# Patient Record
Sex: MALE | Race: WHITE | URBAN - METROPOLITAN AREA
[De-identification: names, ages, dates, MRNs, and addresses within clinical notes are randomized per-mention and may not be internally consistent; named-entity substitution may affect disease eponyms.]

---

## 2023-04-13 ENCOUNTER — OFFICE (OUTPATIENT)
Dept: URBAN - METROPOLITAN AREA CLINIC 104 | Facility: CLINIC | Age: 48
Setting detail: OPHTHALMOLOGY
End: 2023-04-13

## 2023-04-13 DIAGNOSIS — Z01.11: ICD-10-CM

## 2023-04-13 PROCEDURE — 90002 FAA EXAMINATION: CPT | Performed by: SPECIALIST

## 2023-04-13 ASSESSMENT — VISUAL ACUITY
OD_BCVA: 20/20
OS_BCVA: 20/20

## 2023-04-13 ASSESSMENT — CONFRONTATIONAL VISUAL FIELD TEST (CVF)
OD_FINDINGS: FULL
OS_FINDINGS: FULL

## 2023-12-26 PROBLEM — Z00.00 ENCOUNTER FOR PREVENTIVE HEALTH EXAMINATION: Status: ACTIVE | Noted: 2023-12-26

## 2024-01-28 ENCOUNTER — NON-APPOINTMENT (OUTPATIENT)
Age: 49
End: 2024-01-28

## 2024-01-29 ENCOUNTER — APPOINTMENT (OUTPATIENT)
Dept: PULMONOLOGY | Facility: CLINIC | Age: 49
End: 2024-01-29
Payer: COMMERCIAL

## 2024-01-29 ENCOUNTER — TRANSCRIPTION ENCOUNTER (OUTPATIENT)
Age: 49
End: 2024-01-29

## 2024-01-29 ENCOUNTER — NON-APPOINTMENT (OUTPATIENT)
Age: 49
End: 2024-01-29

## 2024-01-29 ENCOUNTER — LABORATORY RESULT (OUTPATIENT)
Age: 49
End: 2024-01-29

## 2024-01-29 VITALS
DIASTOLIC BLOOD PRESSURE: 89 MMHG | SYSTOLIC BLOOD PRESSURE: 129 MMHG | HEIGHT: 71 IN | HEART RATE: 83 BPM | OXYGEN SATURATION: 98 % | WEIGHT: 190 LBS | BODY MASS INDEX: 26.6 KG/M2

## 2024-01-29 VITALS
BODY MASS INDEX: 26.88 KG/M2 | DIASTOLIC BLOOD PRESSURE: 94 MMHG | HEIGHT: 71 IN | WEIGHT: 192 LBS | RESPIRATION RATE: 15 BRPM | SYSTOLIC BLOOD PRESSURE: 132 MMHG | TEMPERATURE: 97.3 F | OXYGEN SATURATION: 96 % | HEART RATE: 82 BPM

## 2024-01-29 DIAGNOSIS — R53.83 OTHER FATIGUE: ICD-10-CM

## 2024-01-29 DIAGNOSIS — Z87.442 PERSONAL HISTORY OF URINARY CALCULI: ICD-10-CM

## 2024-01-29 DIAGNOSIS — Z87.09 PERSONAL HISTORY OF OTHER DISEASES OF THE RESPIRATORY SYSTEM: ICD-10-CM

## 2024-01-29 DIAGNOSIS — D69.3 IMMUNE THROMBOCYTOPENIC PURPURA: ICD-10-CM

## 2024-01-29 DIAGNOSIS — N20.0 CALCULUS OF KIDNEY: ICD-10-CM

## 2024-01-29 DIAGNOSIS — U07.1 COVID-19: ICD-10-CM

## 2024-01-29 DIAGNOSIS — Z86.69 PERSONAL HISTORY OF OTHER DISEASES OF THE NERVOUS SYSTEM AND SENSE ORGANS: ICD-10-CM

## 2024-01-29 DIAGNOSIS — Z86.39 PERSONAL HISTORY OF OTHER ENDOCRINE, NUTRITIONAL AND METABOLIC DISEASE: ICD-10-CM

## 2024-01-29 PROCEDURE — ZZZZZ: CPT

## 2024-01-29 PROCEDURE — 94060 EVALUATION OF WHEEZING: CPT

## 2024-01-29 PROCEDURE — 94729 DIFFUSING CAPACITY: CPT

## 2024-01-29 PROCEDURE — 36415 COLL VENOUS BLD VENIPUNCTURE: CPT

## 2024-01-29 PROCEDURE — 94726 PLETHYSMOGRAPHY LUNG VOLUMES: CPT

## 2024-01-29 PROCEDURE — 99205 OFFICE O/P NEW HI 60 MIN: CPT | Mod: 25

## 2024-01-29 RX ORDER — ASPIRIN 81 MG
81 TABLET, DELAYED RELEASE (ENTERIC COATED) ORAL
Refills: 0 | Status: ACTIVE | COMMUNITY

## 2024-01-29 NOTE — PHYSICAL EXAM
[No Acute Distress] : no acute distress [Normal Oropharynx] : normal oropharynx [Normal Appearance] : normal appearance [No Neck Mass] : no neck mass [Normal Rate/Rhythm] : normal rate/rhythm [Normal S1, S2] : normal s1, s2 [No Murmurs] : no murmurs [No Resp Distress] : no resp distress [Clear to Auscultation Bilaterally] : clear to auscultation bilaterally [No Abnormalities] : no abnormalities [Benign] : benign [Normal Gait] : normal gait [No Clubbing] : no clubbing [No Cyanosis] : no cyanosis [No Edema] : no edema [FROM] : FROM [Normal Color/ Pigmentation] : normal color/ pigmentation [No Focal Deficits] : no focal deficits [Oriented x3] : oriented x3 [Normal Affect] : normal affect [TextBox_99] : hand tremors

## 2024-01-29 NOTE — DISCUSSION/SUMMARY
[FreeTextEntry1] : ---Assessment plan----------The patient has been referred here for further opinion regarding pulmonary problem, 49 yo referred for eval of PULMONARY HYPERTENSION,  H/O SPLENECTOMY FOR ITP-- PMH ITP, renal calculi, essential tremors 1) PULM HTN-----START SILDENAFIL--10 MG PO TID  --- with escalated to 20 mg 3 times daily------- patient's right heart cath does show evidence of precapillary pulmonary hypertension but I am increasing increase intrigued by the decreased LV ejection fraction as well I have advised that he seek another opinion from cardiology and advanced heart failure center at [facility] from Dr. ELIZABETH MYERS------ 2) labs drawn in our office today 3) advised no work until eval completed 4) V/Q SCAN  5 PSG 6---DR ELIZABETH MYERS HEART FAILURE patient's cardiologist has also started patient on metoprolol and Entresto  -- f/u in 2weeks  Thanks for allowing  me to participate  in the care of this patient.  Patient at this time  will follow  the above mentioned recommendations and return back for follow up visit. If you have any questions  I can be reached  at # [phone] (office).  Waldemar Mcconnell MD, [facility]  Pulmonary, Critical Care and Sleep Medicine

## 2024-01-29 NOTE — END OF VISIT
[FreeTextEntry3] :   I, Dr. Waldemar Mcconnell  personally performed the evaluation and management (E/M) services for this established patient who presents today with (a) new problem(s)/exacerbation of (an) existing condition(s). That E/M includes conducting the clinically appropriate interval history &/or exam, assessing all new/exacerbated conditions, and establishing a new plan of care. Today, my АЛЕКСАНДР, Ligia Lawrence NP, , was here to observe my evaluation and management service for this new problem/exacerbated condition and follow the plan of care established by me going forward. [Time Spent: ___ minutes] : I have spent [unfilled] minutes of time on the encounter. [>50% of the face to face encounter time was spent on counseling and/or coordination of care for ___] : Greater than 50% of the face to face encounter time was spent on counseling and/or coordination of care for [unfilled]

## 2024-01-29 NOTE — HISTORY OF PRESENT ILLNESS
[Never] : never [TextBox_4] : This letter  is regarding your patient  who  attended pulmonary out patient office today.  I have reviewed  patient's  past history, social history, family history and medication list. I also  reviewed nurse practitioners/ and fellows  notes and assessment and agree with it.   The patient was referred by /[facility]  49yo referred for newly diagnosed pulm htn PMH ITP in 1990's w/splenectomy, Essential tremors, renal stones w/lithotripsy Works as a  (helicopter) Lifelong nonsmoker  Post covid (12/2022) developed fatigue, ATKINSON. Eval by PMD showed tachycardia which led to cardiology consult- echo showed elevated estimated pa pressures- Admitted to [facility] last month and underwent cardiac cath    ------No history of , fever, chills , rigors, chest pain, or hemoptysis. Questionable history of Raynaud's phenomenon. No h/o significant weight loss in last few months. No history of liver dysfunction , collagen vascular disorder or chronic thromboembolic disease. I would classify the patient's dyspnea as WHO  FUNCTIONAL CLASS II--------  ----Echo  date--2023----LV EF 40-45%, RA and RV dilatation moderate tricuspid regurgitation PASP 73 [facility] --CARDIAC MRI  [facility]-2023---moderately reduced LV systolic function with evidence of RV pressure volume overload moderately dilated right ventricle with reduced systolic function no evidence of intracardiac shunt ----Pft date---1/2024 normal study------ ----Ct scan date-[facility] 2023 -NO PE----- ----Cath date--[facility] 2023--RA 16, PCWP 12 - PA 79/42  RV 79/23 , CO 3.01L/MIN, CI 1.49L/MIN/M2   1/2024 accompanied by wife c/o fatigue, ATKINSON, one recent episode of near syncope (but occured when he was recovering from flu)

## 2024-01-30 ENCOUNTER — NON-APPOINTMENT (OUTPATIENT)
Age: 49
End: 2024-01-30

## 2024-01-30 ENCOUNTER — LABORATORY RESULT (OUTPATIENT)
Age: 49
End: 2024-01-30

## 2024-01-30 ENCOUNTER — RX RENEWAL (OUTPATIENT)
Age: 49
End: 2024-01-30

## 2024-01-30 LAB
25(OH)D3 SERPL-MCNC: 25.4 NG/ML
A1AT SERPL-MCNC: 205 MG/DL
ALBUMIN SERPL ELPH-MCNC: 4.6 G/DL
ALP BLD-CCNC: 66 U/L
ALT SERPL-CCNC: 28 U/L
ANION GAP SERPL CALC-SCNC: 18 MMOL/L
AST SERPL-CCNC: 22 U/L
BILIRUB SERPL-MCNC: 0.9 MG/DL
BUN SERPL-MCNC: 14 MG/DL
CALCIUM SERPL-MCNC: 10.1 MG/DL
CALCIUM SERPL-MCNC: 10.1 MG/DL
CARDIOLIPIN AB SER IA-ACNC: NEGATIVE
CCP AB SER IA-ACNC: <8 UNITS
CHLORIDE SERPL-SCNC: 101 MMOL/L
CO2 SERPL-SCNC: 19 MMOL/L
CREAT SERPL-MCNC: 1.1 MG/DL
CRP SERPL-MCNC: 4 MG/L
DEPRECATED KAPPA LC FREE/LAMBDA SER: 1.29 RATIO
EGFR: 82 ML/MIN/1.73M2
ERYTHROCYTE [SEDIMENTATION RATE] IN BLOOD BY WESTERGREN METHOD: 26 MM/HR
FOLATE RBC-MCNC: 605 NG/ML
GLUCOSE SERPL-MCNC: 102 MG/DL
HCT VFR BLD CALC: 55.6 %
HCT VFR BLD CALC: 55.7 %
HGB BLD-MCNC: 18.8 G/DL
IGA SER QL IEP: 219 MG/DL
IGG SER QL IEP: 1108 MG/DL
IGM SER QL IEP: 22 MG/DL
INR PPP: 0.99 RATIO
KAPPA LC CSF-MCNC: 1.63 MG/DL
KAPPA LC SERPL-MCNC: 2.11 MG/DL
MCHC RBC-ENTMCNC: 30 PG
MCHC RBC-ENTMCNC: 33.8 GM/DL
MCV RBC AUTO: 88.7 FL
NT-PROBNP SERPL-MCNC: 1873 PG/ML
PARATHYROID HORMONE INTACT: 48 PG/ML
PHOSPHATE SERPL-MCNC: 3.6 MG/DL
PLATELET # BLD AUTO: 461 K/UL
POTASSIUM SERPL-SCNC: 5.1 MMOL/L
PROT SERPL-MCNC: 7.4 G/DL
PT BLD: 11.2 SEC
RBC # BLD: 6.27 M/UL
RBC # FLD: 14.4 %
RF+CCP IGG SER-IMP: NEGATIVE
RHEUMATOID FACT SER QL: <10 IU/ML
SODIUM SERPL-SCNC: 138 MMOL/L
TOTAL IGE SMQN RAST: 6 KU/L
TSH SERPL-ACNC: 6.06 UIU/ML
URATE SERPL-MCNC: 3.5 MG/DL
VIT B12 SERPL-MCNC: 435 PG/ML
WBC # FLD AUTO: 9.9 K/UL

## 2024-01-31 LAB
ANACR T: NEGATIVE
ENA SCL70 IGG SER IA-ACNC: <0.2 AL
ENA SS-A AB SER IA-ACNC: <0.2 AL
ENA SS-B AB SER IA-ACNC: <0.2 AL
M TB IFN-G BLD-IMP: NEGATIVE
QUANTIFERON TB PLUS MITOGEN MINUS NIL: 6.18 IU/ML
QUANTIFERON TB PLUS NIL: 0.02 IU/ML
QUANTIFERON TB PLUS TB1 MINUS NIL: 0 IU/ML
QUANTIFERON TB PLUS TB2 MINUS NIL: -0.01 IU/ML
T3 SERPL-MCNC: 124 NG/DL
T3RU NFR SERPL: 1 TBI
T4 FREE SERPL-MCNC: 1 NG/DL
T4 SERPL-MCNC: 6.7 UG/DL

## 2024-02-02 LAB
ALBUMIN MFR SERPL ELPH: 57.7 %
ALBUMIN SERPL-MCNC: 4.4 G/DL
ALBUMIN/GLOB SERPL: 1.3 RATIO
ALPHA1 GLOB MFR SERPL ELPH: 4.9 %
ALPHA1 GLOB SERPL ELPH-MCNC: 0.4 G/DL
ALPHA2 GLOB MFR SERPL ELPH: 12 %
ALPHA2 GLOB SERPL ELPH-MCNC: 0.9 G/DL
B-GLOBULIN MFR SERPL ELPH: 11.9 %
B-GLOBULIN SERPL ELPH-MCNC: 0.9 G/DL
GAMMA GLOB FLD ELPH-MCNC: 1 G/DL
GAMMA GLOB MFR SERPL ELPH: 13.5 %
INTERPRETATION SERPL IEP-IMP: NORMAL
PROT SERPL-MCNC: 7.7 G/DL
PROT SERPL-MCNC: 7.7 G/DL

## 2024-02-05 ENCOUNTER — APPOINTMENT (OUTPATIENT)
Dept: NUCLEAR MEDICINE | Facility: HOSPITAL | Age: 49
End: 2024-02-05

## 2024-02-05 ENCOUNTER — APPOINTMENT (OUTPATIENT)
Dept: RADIOLOGY | Facility: HOSPITAL | Age: 49
End: 2024-02-05

## 2024-02-05 ENCOUNTER — OUTPATIENT (OUTPATIENT)
Dept: OUTPATIENT SERVICES | Facility: HOSPITAL | Age: 49
LOS: 1 days | End: 2024-02-05
Payer: COMMERCIAL

## 2024-02-05 DIAGNOSIS — I27.20 PULMONARY HYPERTENSION, UNSPECIFIED: ICD-10-CM

## 2024-02-05 PROCEDURE — 71046 X-RAY EXAM CHEST 2 VIEWS: CPT | Mod: 26

## 2024-02-05 PROCEDURE — 78582 LUNG VENTILAT&PERFUS IMAGING: CPT | Mod: 26,GC

## 2024-02-08 ENCOUNTER — NON-APPOINTMENT (OUTPATIENT)
Age: 49
End: 2024-02-08

## 2024-02-08 ENCOUNTER — APPOINTMENT (OUTPATIENT)
Dept: HEART FAILURE | Facility: CLINIC | Age: 49
End: 2024-02-08
Payer: COMMERCIAL

## 2024-02-08 VITALS
SYSTOLIC BLOOD PRESSURE: 120 MMHG | WEIGHT: 192 LBS | HEIGHT: 71 IN | HEART RATE: 82 BPM | DIASTOLIC BLOOD PRESSURE: 84 MMHG | BODY MASS INDEX: 26.88 KG/M2 | OXYGEN SATURATION: 97 %

## 2024-02-08 PROCEDURE — 99205 OFFICE O/P NEW HI 60 MIN: CPT | Mod: 25

## 2024-02-08 PROCEDURE — 93000 ELECTROCARDIOGRAM COMPLETE: CPT

## 2024-02-08 RX ORDER — DAPAGLIFLOZIN 10 MG/1
10 TABLET, FILM COATED ORAL
Qty: 30 | Refills: 2 | Status: ACTIVE | COMMUNITY

## 2024-02-08 RX ORDER — METOPROLOL SUCCINATE 50 MG/1
50 TABLET, EXTENDED RELEASE ORAL
Refills: 2 | Status: ACTIVE | COMMUNITY

## 2024-02-08 RX ORDER — ROSUVASTATIN CALCIUM 40 MG/1
40 TABLET, FILM COATED ORAL
Qty: 90 | Refills: 1 | Status: ACTIVE | COMMUNITY

## 2024-02-08 RX ORDER — SACUBITRIL AND VALSARTAN 49; 51 MG/1; MG/1
49-51 TABLET, FILM COATED ORAL TWICE DAILY
Qty: 60 | Refills: 2 | Status: ACTIVE | COMMUNITY

## 2024-02-11 NOTE — DISCUSSION/SUMMARY
[EKG obtained to assist in diagnosis and management of assessed problem(s)] : EKG obtained to assist in diagnosis and management of assessed problem(s)
[EKG obtained to assist in diagnosis and management of assessed problem(s)] : EKG obtained to assist in diagnosis and management of assessed problem(s)
room air

## 2024-02-11 NOTE — PHYSICAL EXAM
[Well Developed] : well developed [Well Nourished] : well nourished [No Acute Distress] : no acute distress [Normal Conjunctiva] : normal conjunctiva [No Carotid Bruit] : no carotid bruit [Normal S1, S2] : normal S1, S2 [No Murmur] : no murmur [No Rub] : no rub [No Gallop] : no gallop [Clear Lung Fields] : clear lung fields [Good Air Entry] : good air entry [No Respiratory Distress] : no respiratory distress  [Soft] : abdomen soft [Non Tender] : non-tender [No Masses/organomegaly] : no masses/organomegaly [Normal Bowel Sounds] : normal bowel sounds [Normal Gait] : normal gait [No Edema] : no edema [No Cyanosis] : no cyanosis [No Varicosities] : no varicosities [No Rash] : no rash [No Skin Lesions] : no skin lesions [Moves all extremities] : moves all extremities [No Focal Deficits] : no focal deficits [Normal Speech] : normal speech [Alert and Oriented] : alert and oriented [Normal memory] : normal memory [de-identified] : mild tremulousness [de-identified] : JVP approx 6-8 with mild HJR [de-identified] : RRR; no prominent P2; no m/r/g; no RV heave [de-identified] : no clubbing

## 2024-02-11 NOTE — PHYSICAL EXAM
[Well Developed] : well developed [Well Nourished] : well nourished [No Acute Distress] : no acute distress [Normal Conjunctiva] : normal conjunctiva [No Carotid Bruit] : no carotid bruit [Normal S1, S2] : normal S1, S2 [No Murmur] : no murmur [No Rub] : no rub [No Gallop] : no gallop [Clear Lung Fields] : clear lung fields [Good Air Entry] : good air entry [No Respiratory Distress] : no respiratory distress  [Soft] : abdomen soft [Non Tender] : non-tender [No Masses/organomegaly] : no masses/organomegaly [Normal Bowel Sounds] : normal bowel sounds [Normal Gait] : normal gait [No Edema] : no edema [No Cyanosis] : no cyanosis [No Varicosities] : no varicosities [No Rash] : no rash [No Skin Lesions] : no skin lesions [Moves all extremities] : moves all extremities [No Focal Deficits] : no focal deficits [Normal Speech] : normal speech [Alert and Oriented] : alert and oriented [Normal memory] : normal memory [de-identified] : RRR; no prominent P2; no m/r/g; no RV heave [de-identified] : mild tremulousness [de-identified] : JVP approx 6-8 with mild HJR [de-identified] : no clubbing

## 2024-02-11 NOTE — ASSESSMENT
[FreeTextEntry1] : Briefly, 47 y/o M w/ h/o ITP s/p splenectomy (1990s), pulmonary HTN (followed by Dr. 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 secondary; Hb 19) who presents for establishment of care. Currently appears euvolemic and well compensated with NYHA class II symptoms. Unclear etiology of pulm HTN but possibility raised for group I. Lower likelihood raised for possible group II as PCWP was wnl. Given polycythemia, may have a component of KAREN given normal sats on RA currently and was ruled out for VARGAS-2 mutation. Overall, will need repeat evaluation.   1. HFmrEF/NICM  - c/w Entresto 49/51 twice/day - c/w Farxiga 10 mg daily - c/w toprol 50 mg daily - no current diuretic need but prescribed lasix prn  - repeat TTE - counseled on disease process  - maintain log of weight/BP  - labs reviewed   2. Pulm HTN - unclear etiology - workup as above - will likely need repeat R/LHC but will obtain TTE first - pending sleep study  RTC 2 months with me

## 2024-02-12 ENCOUNTER — NON-APPOINTMENT (OUTPATIENT)
Age: 49
End: 2024-02-12

## 2024-02-12 ENCOUNTER — APPOINTMENT (OUTPATIENT)
Dept: PULMONOLOGY | Facility: CLINIC | Age: 49
End: 2024-02-12
Payer: COMMERCIAL

## 2024-02-12 VITALS
RESPIRATION RATE: 15 BRPM | BODY MASS INDEX: 26.46 KG/M2 | OXYGEN SATURATION: 98 % | SYSTOLIC BLOOD PRESSURE: 139 MMHG | DIASTOLIC BLOOD PRESSURE: 95 MMHG | HEIGHT: 71 IN | WEIGHT: 189 LBS | TEMPERATURE: 97.8 F | HEART RATE: 81 BPM

## 2024-02-12 PROCEDURE — 99214 OFFICE O/P EST MOD 30 MIN: CPT

## 2024-02-12 NOTE — END OF VISIT
[Time Spent: ___ minutes] : I have spent [unfilled] minutes of time on the encounter. [>50% of the face to face encounter time was spent on counseling and/or coordination of care for ___] : Greater than 50% of the face to face encounter time was spent on counseling and/or coordination of care for [unfilled] [FreeTextEntry3] :   I, Dr. Waldemar Mcconnell  personally performed the evaluation and management (E/M) services for this established patient who presents today with (a) new problem(s)/exacerbation of (an) existing condition(s). That E/M includes conducting the clinically appropriate interval history &/or exam, assessing all new/exacerbated conditions, and establishing a new plan of care. Today, my АЛЕКСАНДР, Ligia Lawrence NP, , was here to observe my evaluation and management service for this new problem/exacerbated condition and follow the plan of care established by me going forward.

## 2024-02-12 NOTE — CARDIOLOGY SUMMARY
[de-identified] : 2/8/24 - NSR, nl axis, TWI V1-V4, II/III/aVF [de-identified] : 2023 - EF 40-45%, RA and RV dilatation, mod TR, PASP 73 [de-identified] : 2023 - MRI ([place]) - mod LV dysfunction, RV pressure/volume overload; no intracardiac shunt   2023 - CT scan ([facility]) - no PE [de-identified] : [facility] 2023 - RA 16, RV 79/23, PA 79/42/54, PCWP 12, CO/CI 3/1.49, TPG 42, DPG 30, PVR 14

## 2024-02-12 NOTE — DISCUSSION/SUMMARY
[FreeTextEntry1] : ---Assessment plan----------The patient has been referred here for further opinion regarding pulmonary problem, 49 yo referred for eval of PULMONARY HYPERTENSION,  H/O SPLENECTOMY FOR ITP-- PMH ITP, renal calculi, essential tremors 1) PULM HTN---ON  SILDENAFIL--10 MG PO TID  --- with escalated to 20 mg 3 times daily------- patient's right heart cath does show evidence of precapillary pulmonary hypertension but I am increasing increase intrigued by the decreased LV ejection fraction as well I have advised that he seek another opinion from cardiology and advanced heart failure center at [facility] from Dr. ELIZABETH MYERS---  --- awaiting repeat echo and possible right heart cath 2) HST  TO R/O KAREN--- 3) advised no work until eval completed 4) V/Q SCAN  5 PSG 6---DR ELIZABETH MYERS HEART FAILURE patient's cardiologist has also started patient on metoprolol and Entresto  -- f/u in 2weeks  Thanks for allowing  me to participate  in the care of this patient.  Patient at this time  will follow  the above mentioned recommendations and return back for follow up visit. If you have any questions  I can be reached  at # [phone] (office).  Waldemar Mcconnell MD, [facility]  Pulmonary, Critical Care and Sleep Medicine

## 2024-02-12 NOTE — CARDIOLOGY SUMMARY
[de-identified] : 2/8/24 - NSR, nl axis, TWI V1-V4, II/III/aVF [de-identified] : 2023 - EF 40-45%, RA and RV dilatation, mod TR, PASP 73 [de-identified] : 2023 - MRI ([facility]) - mod LV dysfunction, RV pressure/volume overload; no intracardiac shunt   2023 - CT scan ([facility]) - no PE [de-identified] : [facility] 2023 - RA 16, RV 79/23, PA 79/42/54, PCWP 12, CO/CI 3/1.49, TPG 42, DPG 30, PVR 14

## 2024-02-12 NOTE — HISTORY OF PRESENT ILLNESS
[FreeTextEntry1] : Briefly, 47 y/o M w/ h/o ITP s/p splenectomy (1990s), pulmonary HTN (followed by Dr. 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 secondary; Hb 19) who presents for establishment of care.  His HPI began in 12/2022 when developed Covid and had fatigue and dyspnea on exertion. Recovered at home. Started to get tired in February 2023. Was worsening over course of the year with some dyspnea with flight of stairs or exercise. Denied orthopnea/PND. Went to his PCP in December 2023. He was found to be tachycardic by his PMD and referred to Dr. Stockton (cardiologist). An echo showed elevated PA pressures so was admitted to [facility] (12/20-12/22). Had a CTA which was negative for PE. Had a cardiac MRI which showed RV pressure/overload with LVEF 37%. Underwent RHC which showed significantly elevated PA pressures (details below) with approximate PVR 14. Was started on toprol, Entresto, Farxiga with improvement. Was also initially on hydralazine but had syncope so was stopped. Did have a presyncopal episode with coughing spell.   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 Was seen by hematology and diagnosed with ITP. Was treated with steroids with transient improvement but had other medication which was ineffective. Underwent splenectomy 11/98 and no issues since.   Denies any snoring. Feels well rested in morning. Denies daytime somnolence. Has sleep study scheduled 2/16.   Reports quick bursts of exercise will cause some dyspnea. Previously was exercising on a treadmill and tolerating gradual increase in activity. Able to go up a flight of stairs without issues.   Recently denies orthopnea/PND. Did note some bendopnea.   Seen by Dr. Mcconnell 1/29/24 where pulmonary HTN workup was started: 2/5/24 - V/Q scan low probability 1/29/24 - CT - no LAD; no PE; "unremarkable" 1/30/24 - serologies negative 1/29/24 - FEV1 4, FVC 5.2, FEV1/FVC 78%, DLCO 104%

## 2024-02-12 NOTE — HISTORY OF PRESENT ILLNESS
[FreeTextEntry1] : Briefly, 49 y/o M w/ h/o ITP s/p splenectomy (1990s), pulmonary HTN (followed by Dr. 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 secondary; Hb 19) who presents for establishment of care.  His HPI began in 12/2022 when developed Covid and had fatigue and dyspnea on exertion. Recovered at home. Started to get tired in February 2023. Was worsening over course of the year with some dyspnea with flight of stairs or exercise. Denied orthopnea/PND. Went to his PCP in December 2023. He was found to be tachycardic by his PMD and referred to Dr. Stockton (cardiologist). An echo showed elevated PA pressures so was admitted to [facility] (12/20-12/22). Had a CTA which was negative for PE. Had a cardiac MRI which showed RV pressure/overload with LVEF 37%. Underwent RHC which showed significantly elevated PA pressures (details below) with approximate PVR 14. Was started on toprol, Entresto, Farxiga with improvement. Was also initially on hydralazine but had syncope so was stopped. Did have a presyncopal episode with coughing spell.   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 Was seen by hematology and diagnosed with ITP. Was treated with steroids with transient improvement but had other medication which was ineffective. Underwent splenectomy 11/98 and no issues since.   Denies any snoring. Feels well rested in morning. Denies daytime somnolence. Has sleep study scheduled 2/16.   Reports quick bursts of exercise will cause some dyspnea. Previously was exercising on a treadmill and tolerating gradual increase in activity. Able to go up a flight of stairs without issues.   Recently denies orthopnea/PND. Did note some bendopnea.   Seen by Dr. Mcconnell 1/29/24 where pulmonary HTN workup was started: 2/5/24 - V/Q scan low probability 1/29/24 - CT - no LAD; no PE; "unremarkable" 1/30/24 - serologies negative 1/29/24 - FEV1 4, FVC 5.2, FEV1/FVC 78%, DLCO 104%

## 2024-02-12 NOTE — SOCIAL HISTORY
[TextEntry] : Occasional EtOH (social). Denies tobacco use. Works as  (hasn't been flying since hospitalization). Lives with wife. Has 2 children (25 and 22).

## 2024-02-12 NOTE — HISTORY OF PRESENT ILLNESS
[Never] : never [TextBox_4] : This letter  is regarding your patient  who  attended pulmonary out patient office today.  I have reviewed  patient's  past history, social history, family history and medication list. I also  reviewed nurse practitioners/ and fellows  notes and assessment and agree with it.   The patient was referred by /[facility]  47yo referred for newly diagnosed pulm htn PMH ITP in 1990's w/splenectomy, Essential tremors, renal stones w/lithotripsy Works as a  (helicopter) Lifelong nonsmoker  Post covid (12/2022) developed fatigue, ATKINSON. Eval by PMD showed tachycardia which led to cardiology consult- echo showed elevated estimated pa pressures- Admitted to [facility] last month and underwent cardiac cath    ------No history of , fever, chills , rigors, chest pain, or hemoptysis. Questionable history of Raynaud's phenomenon. No h/o significant weight loss in last few months. No history of liver dysfunction , collagen vascular disorder or chronic thromboembolic disease. I would classify the patient's dyspnea as WHO  FUNCTIONAL CLASS II--------  ----Echo  date--2023----LV EF 40-45%, RA and RV dilatation moderate tricuspid regurgitation PASP 73 [facility] --CARDIAC MRI   [facility]-2023---moderately reduced LV systolic function with evidence of RV pressure volume overload moderately dilated right ventricle with reduced systolic function no evidence of intracardiac shunt ----Pft date---1/2024 normal study------ ----Ct scan date-[facility] 2023 -NO PE----- ----Cath date--[facility] 2023--RA 16, PCWP 12 - PA 79/42  RV 79/23 , CO 3.01L/MIN, CI 1.49L/MIN/M2   1/2024 accompanied by wife c/o fatigue, ATKINSON, one recent episode of near syncope (but occured when he was recovering from flu)

## 2024-02-26 ENCOUNTER — TRANSCRIPTION ENCOUNTER (OUTPATIENT)
Age: 49
End: 2024-02-26

## 2024-03-01 ENCOUNTER — TRANSCRIPTION ENCOUNTER (OUTPATIENT)
Age: 49
End: 2024-03-01

## 2024-03-04 ENCOUNTER — OUTPATIENT (OUTPATIENT)
Dept: OUTPATIENT SERVICES | Facility: HOSPITAL | Age: 49
LOS: 1 days | End: 2024-03-04
Payer: COMMERCIAL

## 2024-03-04 ENCOUNTER — APPOINTMENT (OUTPATIENT)
Dept: SLEEP CENTER | Facility: CLINIC | Age: 49
End: 2024-03-04
Payer: COMMERCIAL

## 2024-03-04 PROCEDURE — 95800 SLP STDY UNATTENDED: CPT | Mod: 26

## 2024-03-04 PROCEDURE — 95800 SLP STDY UNATTENDED: CPT

## 2024-03-05 ENCOUNTER — NON-APPOINTMENT (OUTPATIENT)
Age: 49
End: 2024-03-05

## 2024-03-10 ENCOUNTER — TRANSCRIPTION ENCOUNTER (OUTPATIENT)
Age: 49
End: 2024-03-10

## 2024-03-10 ENCOUNTER — RX RENEWAL (OUTPATIENT)
Age: 49
End: 2024-03-10

## 2024-03-10 RX ORDER — FUROSEMIDE 20 MG/1
20 TABLET ORAL
Qty: 90 | Refills: 1 | Status: ACTIVE | COMMUNITY
Start: 2024-02-08 | End: 1900-01-01

## 2024-03-11 DIAGNOSIS — G47.33 OBSTRUCTIVE SLEEP APNEA (ADULT) (PEDIATRIC): ICD-10-CM

## 2024-03-15 ENCOUNTER — TRANSCRIPTION ENCOUNTER (OUTPATIENT)
Age: 49
End: 2024-03-15

## 2024-03-19 ENCOUNTER — RESULT REVIEW (OUTPATIENT)
Age: 49
End: 2024-03-19

## 2024-03-19 ENCOUNTER — APPOINTMENT (OUTPATIENT)
Dept: CV DIAGNOSITCS | Facility: HOSPITAL | Age: 49
End: 2024-03-19

## 2024-03-19 ENCOUNTER — OUTPATIENT (OUTPATIENT)
Dept: OUTPATIENT SERVICES | Facility: HOSPITAL | Age: 49
LOS: 1 days | End: 2024-03-19
Payer: COMMERCIAL

## 2024-03-19 DIAGNOSIS — I27.20 PULMONARY HYPERTENSION, UNSPECIFIED: ICD-10-CM

## 2024-03-19 PROCEDURE — 93306 TTE W/DOPPLER COMPLETE: CPT | Mod: 26

## 2024-04-01 ENCOUNTER — RX RENEWAL (OUTPATIENT)
Age: 49
End: 2024-04-01

## 2024-04-01 ENCOUNTER — NON-APPOINTMENT (OUTPATIENT)
Age: 49
End: 2024-04-01

## 2024-04-01 ENCOUNTER — APPOINTMENT (OUTPATIENT)
Dept: PULMONOLOGY | Facility: CLINIC | Age: 49
End: 2024-04-01
Payer: COMMERCIAL

## 2024-04-01 VITALS
HEART RATE: 72 BPM | HEIGHT: 71 IN | DIASTOLIC BLOOD PRESSURE: 78 MMHG | BODY MASS INDEX: 26.6 KG/M2 | SYSTOLIC BLOOD PRESSURE: 119 MMHG | WEIGHT: 190 LBS | TEMPERATURE: 97.9 F | OXYGEN SATURATION: 98 % | RESPIRATION RATE: 14 BRPM

## 2024-04-01 DIAGNOSIS — E03.9 HYPOTHYROIDISM, UNSPECIFIED: ICD-10-CM

## 2024-04-01 LAB
ALBUMIN SERPL ELPH-MCNC: 4.4 G/DL
ALP BLD-CCNC: 69 U/L
ALT SERPL-CCNC: 39 U/L
ANION GAP SERPL CALC-SCNC: 14 MMOL/L
AST SERPL-CCNC: 22 U/L
BILIRUB SERPL-MCNC: 0.9 MG/DL
BUN SERPL-MCNC: 14 MG/DL
CALCIUM SERPL-MCNC: 9.6 MG/DL
CHLORIDE SERPL-SCNC: 104 MMOL/L
CO2 SERPL-SCNC: 22 MMOL/L
CREAT SERPL-MCNC: 1.04 MG/DL
EGFR: 87 ML/MIN/1.73M2
GLUCOSE SERPL-MCNC: 106 MG/DL
HCT VFR BLD CALC: 51.2 %
HGB BLD-MCNC: 17.6 G/DL
MCHC RBC-ENTMCNC: 29.4 PG
MCHC RBC-ENTMCNC: 34.4 GM/DL
MCV RBC AUTO: 85.6 FL
NT-PROBNP SERPL-MCNC: 387 PG/ML
PLATELET # BLD AUTO: 237 K/UL
POTASSIUM SERPL-SCNC: 4.5 MMOL/L
PROT SERPL-MCNC: 6.6 G/DL
RBC # BLD: 5.98 M/UL
RBC # FLD: 15.9 %
SODIUM SERPL-SCNC: 139 MMOL/L
TSH SERPL-ACNC: 3.56 UIU/ML
WBC # FLD AUTO: 9.88 K/UL

## 2024-04-01 PROCEDURE — 99215 OFFICE O/P EST HI 40 MIN: CPT

## 2024-04-01 PROCEDURE — 36415 COLL VENOUS BLD VENIPUNCTURE: CPT

## 2024-04-01 RX ORDER — SILDENAFIL 20 MG/1
20 TABLET ORAL 3 TIMES DAILY
Qty: 90 | Refills: 3 | Status: ACTIVE | COMMUNITY
Start: 2024-01-29 | End: 1900-01-01

## 2024-04-01 NOTE — REVIEW OF SYSTEMS
[Cough] : cough [Fever] : no fever [Chest Discomfort] : no chest discomfort [Hay Fever] : no hay fever [Nocturia] : no nocturia [GERD] : no gerd [Raynaud] : no raynaud [Arthralgias] : no arthralgias [Anemia] : no anemia [Depression] : no depression [Headache] : no headache [Obesity] : no obesity

## 2024-04-01 NOTE — HISTORY OF PRESENT ILLNESS
[Never] : never [Obstructive Sleep Apnea] : obstructive sleep apnea [TextBox_4] : This letter  is regarding your patient  who  attended pulmonary out patient office today.  I have reviewed  patient's  past history, social history, family history and medication list. I also  reviewed nurse practitioners/ and fellows  notes and assessment and agree with it.   The patient was referred by /[facility]  47yo referred for newly diagnosed pulm htn PMH ITP in 1990's w/splenectomy, Essential tremors, renal stones w/lithotripsy Works as a  (helicopter) Lifelong nonsmoker  Post covid (12/2022) developed fatigue, ATKINSON. Eval by PMD showed tachycardia which led to cardiology consult- echo showed elevated estimated pa pressures- Admitted to [facility] last month and underwent cardiac cath    ------No history of , fever, chills , rigors, chest pain, or hemoptysis. Questionable history of Raynaud's phenomenon. No h/o significant weight loss in last few months. No history of liver dysfunction , collagen vascular disorder or chronic thromboembolic disease. I would classify the patient's dyspnea as WHO  FUNCTIONAL CLASS II--------  ----Echo  date--2023----LV EF 40-45%, RA and RV dilatation moderate tricuspid regurgitation PASP 73 [facility] --CARDIAC MRI   [facility]-2023---moderately reduced LV systolic function with evidence of RV pressure volume overload moderately dilated right ventricle with reduced systolic function no evidence of intracardiac shunt ----Pft date---1/2024 normal study------ ----Ct scan date-[facility] 2023 -NO PE----- ----Cath date--[facility] 2023--RA 16, PCWP 12 - PA 79/42  RV 79/23 , CO 3.01L/MIN, CI 1.49L/MIN/M2   1/2024 accompanied by wife c/o fatigue, ATKINSON, one recent episode of near syncope (but occured when he was recovering from flu)    4/1/2024 Pulm htn- remains on sildenafil 10mg tid doing well we will increase it follows heart failure team- on lasix as needed HST w/jil- cpap study pending no pulm issues today accomapnied by wife to this visit [TextBox_108] : 11.8 [TextBox_100] : 3/2024 [TextBox_112] : 82.2

## 2024-04-01 NOTE — END OF VISIT
[Time Spent: ___ minutes] : I have spent [unfilled] minutes of time on the encounter. [FreeTextEntry3] :   I, Dr. Waldemar Mcconnell  personally performed the evaluation and management (E/M) services for this established patient who presents today with (a) new problem(s)/exacerbation of (an) existing condition(s). That E/M includes conducting the clinically appropriate interval history &/or exam, assessing all new/exacerbated conditions, and establishing a new plan of care. Today, my АЛЕКСАНДР, Ligia Lawrence NP, , was here to observe my evaluation and management service for this new problem/exacerbated condition and follow the plan of care established by me going forward.

## 2024-04-01 NOTE — DISCUSSION/SUMMARY
[FreeTextEntry1] : ---Assessment plan----------The patient has been referred here for further opinion regarding pulmonary problem, 47 yo referred for eval of PULMONARY HYPERTENSION,  H/O SPLENECTOMY FOR ITP-- PMH ITP, renal calculi, essential tremors 1) PULM HTN---ON  SILDENAFIL--10 MG PO TID  --- with escalated to 20 mg 3 times daily------- patient's right heart cath does show evidence of precapillary pulmonary hypertension but I am increasing increase intrigued by the decreased LV ejection fraction as well I have advised that he seek another opinion from cardiology and advanced heart failure center at [facility] from Dr. ELIZABETH MYERS---  --- awaiting repeat echo and possible right heart cath 2) HST  with KAREN---o/s cpap study 3) advised no work until eval completed 4) labs drawn in our office today 5 )-DR ELIZABETH MYERS HEART FAILURE patient's cardiologist has also started patient on metoprolol and Entresto 6) f/u MAY 2024 -- f/u in 2weeks  Thanks for allowing  me to participate  in the care of this patient.  Patient at this time  will follow  the above mentioned recommendations and return back for follow up visit. If you have any questions  I can be reached  at # [phone] (office).  Waldemar Mcconnell MD, [facility]  Pulmonary, Critical Care and Sleep Medicine

## 2024-04-02 ENCOUNTER — RX RENEWAL (OUTPATIENT)
Age: 49
End: 2024-04-02

## 2024-04-03 ENCOUNTER — TRANSCRIPTION ENCOUNTER (OUTPATIENT)
Age: 49
End: 2024-04-03

## 2024-04-12 ENCOUNTER — NON-APPOINTMENT (OUTPATIENT)
Age: 49
End: 2024-04-12

## 2024-04-12 ENCOUNTER — TRANSCRIPTION ENCOUNTER (OUTPATIENT)
Age: 49
End: 2024-04-12

## 2024-04-14 ENCOUNTER — APPOINTMENT (OUTPATIENT)
Dept: SLEEP CENTER | Facility: CLINIC | Age: 49
End: 2024-04-14
Payer: COMMERCIAL

## 2024-04-14 ENCOUNTER — OUTPATIENT (OUTPATIENT)
Dept: OUTPATIENT SERVICES | Facility: HOSPITAL | Age: 49
LOS: 1 days | End: 2024-04-14
Payer: COMMERCIAL

## 2024-04-14 PROCEDURE — 95811 POLYSOM 6/>YRS CPAP 4/> PARM: CPT | Mod: 26

## 2024-04-14 PROCEDURE — 95811 POLYSOM 6/>YRS CPAP 4/> PARM: CPT

## 2024-04-15 ENCOUNTER — TRANSCRIPTION ENCOUNTER (OUTPATIENT)
Age: 49
End: 2024-04-15

## 2024-04-19 ENCOUNTER — APPOINTMENT (OUTPATIENT)
Dept: HEART FAILURE | Facility: CLINIC | Age: 49
End: 2024-04-19
Payer: COMMERCIAL

## 2024-04-19 ENCOUNTER — NON-APPOINTMENT (OUTPATIENT)
Age: 49
End: 2024-04-19

## 2024-04-19 VITALS
OXYGEN SATURATION: 98 % | HEIGHT: 71 IN | HEART RATE: 78 BPM | BODY MASS INDEX: 27.3 KG/M2 | SYSTOLIC BLOOD PRESSURE: 113 MMHG | DIASTOLIC BLOOD PRESSURE: 80 MMHG | WEIGHT: 195 LBS

## 2024-04-19 DIAGNOSIS — I27.20 PULMONARY HYPERTENSION, UNSPECIFIED: ICD-10-CM

## 2024-04-19 DIAGNOSIS — G47.33 OBSTRUCTIVE SLEEP APNEA (ADULT) (PEDIATRIC): ICD-10-CM

## 2024-04-19 PROCEDURE — 93000 ELECTROCARDIOGRAM COMPLETE: CPT

## 2024-04-19 PROCEDURE — G2211 COMPLEX E/M VISIT ADD ON: CPT | Mod: NC,1L

## 2024-04-19 PROCEDURE — 99214 OFFICE O/P EST MOD 30 MIN: CPT | Mod: 25

## 2024-04-24 NOTE — CARDIOLOGY SUMMARY
[de-identified] : 4/19/24 NSR 66, nl axis, TWI V1-V4, II/III/aVF, RV strain, no change 2/8/24 - NSR, nl axis, TWI V1-V4, II/III/aVF [de-identified] : 3/19/24 - EF read as 42% (appears closer to 45-50%), LVEDD 4.6 cm, E/e' 6, no diastolic dysfunction, nl LA size, mod-severe RV dilatation/dysfunction, mild TR (approximate PASP 50), ? mid-systolic notching in PW of RVOT, D shape RV in systole c/w pressure overload, LVOT VTI 13 cm, IVC 1.9 cm 12/20/2023 - EF 40-45%, LVEDD 3.8 cm, septum 1.3 cm, PW 1.2 cm, E/e' 6, LVOT VTI 9.8 cm, RA and RV dilatation, mod TR, PASP 73 [de-identified] : 12/21/23 - [facility] - RA 16, RV 79/23, PA 79/42/55, PCWP 12, LVEDP 12, CO/CI 3/1.49, TPG 43, DPG 30, PVR 14; vasodilator testing done - Fransisca 40  w/o drop in PA pressures sat run - SVC 58%, IVC 62%, RV 58%, PA 64% (no significant step up); Qp/Qs 0.87 LHC - minor irregularities [de-identified] : 2023 - MRI ([place]) - mod LV dysfunction (40-45%), LVEDD 3.8 cm, septum 1.3, PW 1.2 cm, nl LA size, mod-severe RV dilatation, mild RV dysfunction with Kaba's sign, no PFO, RV pressure/volume overload; no intracardiac shunt; E/e' 10, LVOT VTI 9.8 cm, mod TR, RVSP 73,   2023 - CT scan ([facility]) - no PE

## 2024-04-24 NOTE — ASSESSMENT
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 Mcconnell), HFimpEF (EF initially 40%, LVEDD 3.8 cm now improved to 50%), polycythemia (? secondary; Hb 19), KAREN (Dx 3/24; on CPAP), prior Covid (12/22) who presents for f/u of care. Currently appears euvolemic and well compensated with NYHA class II symptoms. Unclear etiology of pulm HTN but possibility raised for group I. Overall, will need repeat evaluation. Appears euvolemic and normotensive with Class I symptoms, improvement in symptoms  1. HFmrEF/NICM  - c/w Entresto 49/51 twice/day - c/w Farxiga 10 mg daily - c/w toprol 50 mg daily - no current diuretic need but on lasix prn  - repeat TTE notable for EF read as 42% (appears closer to 45-50%), LVEDD 4.6 cm, E/e' 6, no diastolic dysfunction, nl LA size, mod-severe RV dilatation/dysfunction, mild TR (approximate PASP 50), ? mid-systolic notching in PW of RVOT, D shape RV in systole c/w pressure overload, LVOT VTI 13 cm, IVC 1.9 cm - counseled on disease process  - maintain log of weight/BP  - labs reviewed from 4/1 with improvement in serum pro  - will proceed with RHC/LHC (for LVEDP) - may consider genetic testing to see if any genetics for pHTN  2. Pulm HTN - unclear etiology; ? related to splenectomy although reports suggest it leads to more of a CTEPH (d/t unregulated abnormal RBC leading to PLT activation) picture which is negative on V/Q scan - workup as above - will likely need repeat R/LHC; will arrange - Mild KAREN on sleep study and will start CPAP - improved symptoms on increased sildenafil - will d/w hematologist of the possibility if related to splenectomy  RTC 3 months with me

## 2024-04-24 NOTE — HISTORY OF PRESENT ILLNESS
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 Mcconnell), HFimpEF (EF initially 40%, LVEDD 3.8 cm now improved to 50%), polycythemia (? secondary; Hb 19), KAREN (Dx 3/24; on CPAP), prior Covid (12/22) who presents for f/u of care. Referred by Dr. Mcconnell.   For full initial details, please refer to note from 2/8/24.   Since last visit, he had sleep studies and will start on CPAP for mild sleep apnea. Reports home weight is 169.9 lbs and has taken furosemide 20 mg approx 4 times since last visit.   Had TTE 3/19 which showed EF approx 42% (appears closer to 45-50%), enlarged RV and mod RV dysfunction with mild TR and PASP in 50s.   Reports no further dyspnea with quick bursts of exercise. Previously was exercising on a treadmill for 30-45 minutes 2-3 times a week and tolerating gradual increase in activity. Able to go up 2 flights of stairs without issues. Feels he has had improvement in symptoms with increase in sildenafil to 20 mg q 8. He rarely uses furosemide. Coughing has improved overall the past month.   Recently denies orthopnea/PND. Previously had some bendopnea which has improved but still has mild dyspnea with crouching .   Had seen hematology recently for elevated Hb. PCV workup negative. Labs with pulm 4/1/24 notable for K 4.5, BUN/creat 14/1.04 with improvement in serum pro  fro 1873, normal TSH 3.56.   Denies chest pain, palpitations, dizziness/LH, syncope and he does not have an ICD.  Seen by Dr. Mcconnell 1/29/24 where pulmonary HTN workup was started: 2/5/24 - V/Q scan low probability 1/29/24 - CT - no LAD; no PE; "unremarkable" 1/30/24 - serologies negative 1/29/24 - FEV1 4, FVC 5.2, FEV1/FVC 78%, DLCO 104% 3/4/24 - sleep study - mild KAREN with mod O2 desaturation; jose 82%

## 2024-04-24 NOTE — PHYSICAL EXAM
[de-identified] : mild tremulousness [de-identified] : JVP approx 6-8  [de-identified] : RRR; no prominent P2; no m/r/g; no RV heave [de-identified] : LCR [de-identified] : no clubbing

## 2024-05-03 ENCOUNTER — APPOINTMENT (OUTPATIENT)
Dept: PULMONOLOGY | Facility: CLINIC | Age: 49
End: 2024-05-03
Payer: COMMERCIAL

## 2024-05-03 VITALS
RESPIRATION RATE: 15 BRPM | SYSTOLIC BLOOD PRESSURE: 136 MMHG | TEMPERATURE: 97.6 F | WEIGHT: 190.31 LBS | DIASTOLIC BLOOD PRESSURE: 92 MMHG | HEIGHT: 71 IN | BODY MASS INDEX: 26.64 KG/M2 | OXYGEN SATURATION: 96 % | HEART RATE: 73 BPM

## 2024-05-03 PROCEDURE — 99215 OFFICE O/P EST HI 40 MIN: CPT

## 2024-05-06 NOTE — REVIEW OF SYSTEMS
[Fever] : no fever [Dry Eyes] : no dry eyes [Ear Disturbance] : no ear disturbance [Cough] : no cough [Dyspnea] : no dyspnea [Chest Discomfort] : no chest discomfort [Hay Fever] : no hay fever [GERD] : no gerd [Nocturia] : no nocturia [Arthralgias] : no arthralgias [Raynaud] : no raynaud [Anemia] : no anemia [Headache] : no headache [Depression] : no depression [Obesity] : no obesity

## 2024-05-06 NOTE — DISCUSSION/SUMMARY
[FreeTextEntry1] : ---Assessment plan----------The patient has been referred here for further opinion regarding pulmonary problem, 49 yo referred for eval of PULMONARY HYPERTENSION,  H/O SPLENECTOMY FOR ITP-- PMH ITP, renal calculi, essential tremors 1) PULM HTN---ON  SILDENAFIL--20 MG PO TID  ---right heart cath does show evidence of precapillary pulmonary hypertension but I am increasing increase intrigued by the decreased LV ejection fraction as well I have advised that he seek another opinion from cardiology and advanced heart failure center at [facility] from Dr. ELIZABETH MYERS---  --- awaiting r right heart cath 5/8/24 2) HST  with KAREN---awaiting to recieve CPAP  3) advised no work until eval completed 4 )-DR ELIZABETH MYERS HEART FAILURE patient's cardiologist has also started patient on metoprolol and Entresto 5) f/u 2 weeks   Thanks for allowing  me to participate  in the care of this patient.  Patient at this time  will follow  the above mentioned recommendations and return back for follow up visit. If you have any questions  I can be reached  at # [phone] (office).  Waldemar Mcconnell MD, FCCP  Pulmonary, Critical Care and Sleep Medicine

## 2024-05-06 NOTE — HISTORY OF PRESENT ILLNESS
[Never] : never [Obstructive Sleep Apnea] : obstructive sleep apnea [TextBox_4] : This letter  is regarding your patient  who  attended pulmonary out patient office today.  I have reviewed  patient's  past history, social history, family history and medication list. I also  reviewed nurse practitioners/ and fellows  notes and assessment and agree with it.   The patient was referred by /[place] [age]yo referred for newly diagnosed pulm htn PMH ITP in 1990's w/splenectomy, Essential tremors, renal stones w/lithotripsy Works as a  (helicopter) Lifelong nonsmoker  Post covid (12/2022) developed fatigue, ATKINSON. Eval by PMD showed tachycardia which led to cardiology consult- echo showed elevated estimated pa pressures- Admitted to [facility] last month and underwent cardiac cath    ------No history of , fever, chills , rigors, chest pain, or hemoptysis. Questionable history of Raynaud's phenomenon. No h/o significant weight loss in last few months. No history of liver dysfunction , collagen vascular disorder or chronic thromboembolic disease. I would classify the patient's dyspnea as WHO  FUNCTIONAL CLASS II--------  ----Echo  date--2023----LV EF 40-45%, RA and RV dilatation moderate tricuspid regurgitation PASP 73 MMHG --CARDIAC MRI  [facility]-2023---moderately reduced LV systolic function with evidence of RV pressure volume overload moderately dilated right ventricle with reduced systolic function no evidence of intracardiac shunt ----Pft date---1/2024 normal study------ ----Ct scan date-[facility] 2023 -NO PE----- ----Cath date--[facility] 2023--RA 16, PCWP 12 - PA 79/42  RV 79/23 , CO 3.01L/MIN, CI 1.49L/MIN/M2   1/2024 accompanied by wife c/o fatigue, ATKINSON, one recent episode of near syncope (but occured when he was recovering from flu)    4/1/2024 Pulm htn- remains on sildenafil 10mg tid doing well we will increase it follows heart failure team- on lasix as needed HST w/jil- cpap study pending no pulm issues today accomapnied by wife to this visit  5/3/2024- Pulm htn- Sildenafil 20 mg TID- does not have any symptoms today  followed by Dr. Wallace- Furosemide 20 mg as needed.  Repeat cath 5/8/2024 HST- awaiting machine to be send home  accompanied with wife during the visit   [TextBox_100] : 3/2024 [TextBox_108] : 11.8 [TextBox_112] : 82.2

## 2024-05-08 ENCOUNTER — TRANSCRIPTION ENCOUNTER (OUTPATIENT)
Age: 49
End: 2024-05-08

## 2024-05-08 ENCOUNTER — OUTPATIENT (OUTPATIENT)
Dept: OUTPATIENT SERVICES | Facility: HOSPITAL | Age: 49
LOS: 1 days | End: 2024-05-08
Payer: COMMERCIAL

## 2024-05-08 VITALS
SYSTOLIC BLOOD PRESSURE: 129 MMHG | OXYGEN SATURATION: 98 % | DIASTOLIC BLOOD PRESSURE: 91 MMHG | TEMPERATURE: 98 F | HEIGHT: 70 IN | WEIGHT: 184.97 LBS | HEART RATE: 72 BPM | RESPIRATION RATE: 18 BRPM

## 2024-05-08 VITALS
SYSTOLIC BLOOD PRESSURE: 110 MMHG | OXYGEN SATURATION: 96 % | DIASTOLIC BLOOD PRESSURE: 65 MMHG | HEART RATE: 65 BPM | RESPIRATION RATE: 18 BRPM

## 2024-05-08 DIAGNOSIS — I27.20 PULMONARY HYPERTENSION, UNSPECIFIED: ICD-10-CM

## 2024-05-08 LAB
ANION GAP SERPL CALC-SCNC: 13 MMOL/L — SIGNIFICANT CHANGE UP (ref 5–17)
BUN SERPL-MCNC: 19 MG/DL — SIGNIFICANT CHANGE UP (ref 7–23)
CALCIUM SERPL-MCNC: 9.8 MG/DL — SIGNIFICANT CHANGE UP (ref 8.4–10.5)
CHLORIDE SERPL-SCNC: 102 MMOL/L — SIGNIFICANT CHANGE UP (ref 96–108)
CO2 SERPL-SCNC: 22 MMOL/L — SIGNIFICANT CHANGE UP (ref 22–31)
CREAT SERPL-MCNC: 1.03 MG/DL — SIGNIFICANT CHANGE UP (ref 0.5–1.3)
EGFR: 88 ML/MIN/1.73M2 — SIGNIFICANT CHANGE UP
GLUCOSE SERPL-MCNC: 99 MG/DL — SIGNIFICANT CHANGE UP (ref 70–99)
HCT VFR BLD CALC: 54.1 % — HIGH (ref 39–50)
HGB BLD-MCNC: 18.4 G/DL — HIGH (ref 13–17)
MCHC RBC-ENTMCNC: 29.4 PG — SIGNIFICANT CHANGE UP (ref 27–34)
MCHC RBC-ENTMCNC: 34 GM/DL — SIGNIFICANT CHANGE UP (ref 32–36)
MCV RBC AUTO: 86.4 FL — SIGNIFICANT CHANGE UP (ref 80–100)
NRBC # BLD: 0 /100 WBCS — SIGNIFICANT CHANGE UP (ref 0–0)
PLATELET # BLD AUTO: 267 K/UL — SIGNIFICANT CHANGE UP (ref 150–400)
POTASSIUM SERPL-MCNC: 3.8 MMOL/L — SIGNIFICANT CHANGE UP (ref 3.5–5.3)
POTASSIUM SERPL-SCNC: 3.8 MMOL/L — SIGNIFICANT CHANGE UP (ref 3.5–5.3)
RBC # BLD: 6.26 M/UL — HIGH (ref 4.2–5.8)
RBC # FLD: 14.3 % — SIGNIFICANT CHANGE UP (ref 10.3–14.5)
SODIUM SERPL-SCNC: 137 MMOL/L — SIGNIFICANT CHANGE UP (ref 135–145)
WBC # BLD: 11.51 K/UL — HIGH (ref 3.8–10.5)
WBC # FLD AUTO: 11.51 K/UL — HIGH (ref 3.8–10.5)

## 2024-05-08 PROCEDURE — 93453 R&L HRT CATH W/VENTRICLGRPHY: CPT

## 2024-05-08 PROCEDURE — C1894: CPT

## 2024-05-08 PROCEDURE — 93005 ELECTROCARDIOGRAM TRACING: CPT

## 2024-05-08 PROCEDURE — 93463 DRUG ADMIN & HEMODYNMIC MEAS: CPT | Mod: 59

## 2024-05-08 PROCEDURE — C1887: CPT

## 2024-05-08 PROCEDURE — 36415 COLL VENOUS BLD VENIPUNCTURE: CPT

## 2024-05-08 PROCEDURE — 99152 MOD SED SAME PHYS/QHP 5/>YRS: CPT

## 2024-05-08 PROCEDURE — 93453 R&L HRT CATH W/VENTRICLGRPHY: CPT | Mod: 26

## 2024-05-08 PROCEDURE — 93010 ELECTROCARDIOGRAM REPORT: CPT

## 2024-05-08 PROCEDURE — 82803 BLOOD GASES ANY COMBINATION: CPT

## 2024-05-08 PROCEDURE — 80048 BASIC METABOLIC PNL TOTAL CA: CPT

## 2024-05-08 PROCEDURE — 94799 UNLISTED PULMONARY SVC/PX: CPT

## 2024-05-08 PROCEDURE — C1769: CPT

## 2024-05-08 PROCEDURE — 85027 COMPLETE CBC AUTOMATED: CPT

## 2024-05-08 RX ORDER — DAPAGLIFLOZIN 10 MG/1
1 TABLET, FILM COATED ORAL
Refills: 0 | DISCHARGE

## 2024-05-08 RX ORDER — ASPIRIN/CALCIUM CARB/MAGNESIUM 324 MG
1 TABLET ORAL
Refills: 0 | DISCHARGE

## 2024-05-08 RX ORDER — SACUBITRIL AND VALSARTAN 24; 26 MG/1; MG/1
1 TABLET, FILM COATED ORAL
Refills: 0 | DISCHARGE

## 2024-05-08 RX ORDER — ROSUVASTATIN CALCIUM 5 MG/1
1 TABLET ORAL
Refills: 0 | DISCHARGE

## 2024-05-08 RX ORDER — FUROSEMIDE 40 MG
1 TABLET ORAL
Refills: 0 | DISCHARGE

## 2024-05-08 RX ORDER — METOPROLOL TARTRATE 50 MG
1 TABLET ORAL
Refills: 0 | DISCHARGE

## 2024-05-08 RX ORDER — LEVOTHYROXINE SODIUM 125 MCG
1 TABLET ORAL
Refills: 0 | DISCHARGE

## 2024-05-08 NOTE — H&P CARDIOLOGY - NSICDXFAMILYHX_GEN_ALL_CORE_FT
FAMILY HISTORY:  Father  Still living? Unknown  FH: myocardial infarction, Age at diagnosis: Age Unknown     FAMILY HISTORY:  Father  Still living? Unknown  FH: myocardial infarction, Age at diagnosis: Age Unknown    Sibling  Still living? Unknown  FH: pulmonary embolism, Age at diagnosis: Age Unknown

## 2024-05-08 NOTE — ASU DISCHARGE PLAN (ADULT/PEDIATRIC) - ***IN THE EVENT THAT YOU DEVELOP A COMPLICATION AND YOU ARE UNABLE TO REACH YOUR OWN PHYSICIAN, YOU MAY CONTACT:
OVERNIGHT EVENTS: doing fine, no acute issues     Present Symptoms:     Dyspnea: none   Nausea/Vomiting: No  Anxiety:  No  Depression: No  Fatigue: No  Loss of appetite: No  Constipation: none     Pain: none currently             Character-            Duration-            Effect-            Factors-            Frequency-            Location-            Severity-    Pain AD Score:  [email or web address] (press ctrl + left click to view)    Review of Systems: Reviewed                     Negative: no chest pain                      All others negative    MEDICATIONS  (STANDING):  acetaminophen     Tablet .. 650 milliGRAM(s) Oral every 8 hours  albuterol    90 MICROgram(s) HFA Inhaler 2 Puff(s) Inhalation every 4 hours  apixaban 2.5 milliGRAM(s) Oral two times a day  budesonide  80 MICROgram(s)/formoterol 4.5 MICROgram(s) Inhaler 2 Puff(s) Inhalation two times a day  cefTRIAXone Injectable. 1000 milliGRAM(s) IV Push every 24 hours  chlorhexidine 2% Cloths 1 Application(s) Topical daily  chlorhexidine 2% Cloths 1 Application(s) Topical <User Schedule>  cholecalciferol 1000 Unit(s) Oral daily  dextrose 5%. 1000 milliLiter(s) (100 mL/Hr) IV Continuous <Continuous>  dextrose 5%. 1000 milliLiter(s) (50 mL/Hr) IV Continuous <Continuous>  dextrose 50% Injectable 25 Gram(s) IV Push once  dextrose 50% Injectable 12.5 Gram(s) IV Push once  dextrose 50% Injectable 25 Gram(s) IV Push once  diltiazem    milliGRAM(s) Oral daily  epoetin susie-epbx (RETACRIT) Injectable 96729 Unit(s) IV Push <User Schedule>  escitalopram 20 milliGRAM(s) Oral daily  glucagon  Injectable 1 milliGRAM(s) IntraMuscular once  guaiFENesin ER 1200 milliGRAM(s) Oral every 12 hours  hydrALAZINE 50 milliGRAM(s) Oral every 8 hours  insulin glargine Injectable (LANTUS) 15 Unit(s) SubCutaneous at bedtime  insulin lispro (ADMELOG) corrective regimen sliding scale   SubCutaneous three times a day before meals  insulin lispro Injectable (ADMELOG) 7 Unit(s) SubCutaneous three times a day before meals  metoprolol tartrate 50 milliGRAM(s) Oral two times a day  pantoprazole    Tablet 40 milliGRAM(s) Oral before breakfast    MEDICATIONS  (PRN):  dextrose Oral Gel 15 Gram(s) Oral once PRN Blood Glucose LESS THAN 70 milliGRAM(s)/deciliter  hydrALAZINE Injectable 10 milliGRAM(s) IV Push every 6 hours PRN SBP > 160  melatonin 1 milliGRAM(s) Oral at bedtime PRN Sleep  sodium chloride 0.9% lock flush 10 milliLiter(s) IV Push every 1 hour PRN Pre/post blood products, medications, blood draw, and to maintain line patency  sodium chloride 0.9% lock flush 10 milliLiter(s) IV Push every 1 hour PRN Pre/post blood products, medications, blood draw, and to maintain line patency      PHYSICAL EXAM:    Vital Signs Last 24 Hrs  T(C): 36.6 (23 Dec 2022 10:58), Max: 36.9 (23 Dec 2022 04:43)  T(F): 97.9 (23 Dec 2022 10:58), Max: 98.4 (23 Dec 2022 04:43)  HR: 65 (23 Dec 2022 10:58) (58 - 65)  BP: 161/67 (23 Dec 2022 10:58) (138/58 - 172/68)  BP(mean): --  RR: 18 (23 Dec 2022 10:58) (18 - 18)  SpO2: 100% (23 Dec 2022 10:58) (98% - 100%)    Parameters below as of 23 Dec 2022 10:58  Patient On (Oxygen Delivery Method): nasal cannula    General: alert and in no acute distress     HEENT: normal      Lungs: comfortable     CV: normal      GI: normal      : normal      MSK: weakness     Skin: no rash    LABS:                          8.7    11.80 )-----------( 156      ( 23 Dec 2022 07:55 )             28.9     12-23    132<L>  |  95<L>  |  85.7<H>  ----------------------------<  346<H>  5.5<H>   |  24.0  |  4.57<H>    Ca    7.5<L>      23 Dec 2022 07:55  Phos  5.3     12-23  Mg     2.0     12-23    TPro  4.4<L>  /  Alb  2.3<L>  /  TBili  <0.2<L>  /  DBili  x   /  AST  8   /  ALT  5   /  AlkPhos  72  12-23    I&O's Summary    22 Dec 2022 07:01  -  23 Dec 2022 07:00  --------------------------------------------------------  IN: 120 mL / OUT: 10 mL / NET: 110 mL    23 Dec 2022 07:01  -  23 Dec 2022 11:37  --------------------------------------------------------  IN: 0 mL / OUT: 1000 mL / NET: -1000 mL    RADIOLOGY & ADDITIONAL STUDIES:    < from: Xray Chest 1 View- PORTABLE-Routine (Xray Chest 1 View- PORTABLE-Routine in AM.) (12.22.22 @ 06:02) >  ACC: [id number] EXAM:  XR CHEST PORTABLE ROUTINE 1V                          PROCEDURE DATE:  12/22/2022      INTERPRETATION:  Clinical History: 80-year-old female, follow-up PTC.    Portable view of the chest is compared to 12/21/2022.    FINDINGS: Mild cardiomegaly and normal pulmonary vasculature with no   consolidation, effusion, pneumothorax or acute osseous finding.    Right-sided large-bore double-lumen central line with the tip at the   junction of the SVC and right atrium, new.    IMPRESSION:  Right-sided central line in satisfactory position, new    --- End of Report ---    < end of copied text >    ADVANCE DIRECTIVES/TREATMENT PREFERENCES:  do not resuscitate and do not intubate  Statement Selected

## 2024-05-08 NOTE — ASU DISCHARGE PLAN (ADULT/PEDIATRIC) - CARE PROVIDER_API CALL
Franco Wallace  [facility]  [address] [place] [address] - [place] [address]  Phone: [phone]  Fax: [phone]  Established Patient  Follow Up Time: Routine

## 2024-05-08 NOTE — H&P CARDIOLOGY - NSICDXPASTMEDICALHX_GEN_ALL_CORE_FT
PAST MEDICAL HISTORY:  Pulmonary HTN      PAST MEDICAL HISTORY:  History of ITP     Pulmonary HTN

## 2024-05-08 NOTE — H&P CARDIOLOGY - HISTORY OF PRESENT ILLNESS
47yo referred for newly diagnosed pulm htn  PMH ITP in 1990's w/splenectomy, Essential tremors, renal stones w/lithotripsy  Works as a  (helicopter) Lifelong nonsmoker    Post covid (12/2022) developed fatigue, ATKINSON. Eval by PMD showed tachycardia which led to cardiology consult- echo showed elevated estimated pa pressures- Admitted to [facility] last month and underwent cardiac cath       ------No history of , fever, chills , rigors, chest pain, or hemoptysis. Questionable history of Raynaud's phenomenon. No h/o significant weight loss in last few months. No history of liver dysfunction , collagen vascular disorder or chronic thromboembolic disease. I would classify the patient's dyspnea as WHO FUNCTIONAL CLASS II--------    ----Echo date--2023----LV EF 40-45%, RA and RV dilatation moderate tricuspid regurgitation PASP 73 MMHG  --CARDIAC MRI [facility]-2023---moderately reduced LV systolic function with evidence of RV pressure volume overload moderately dilated right ventricle with reduced systolic function no evidence of intracardiac shunt  ----Pft date---1/2024 normal study------  ----Ct scan date-[facility] 2023 -NO PE-----  ----Cath date--[facility] 2023--RA 16, PCWP 12 - PA 79/42 RV 79/23 , CO 3.01L/MIN, CI 1.49L/MIN/M2      1/2024 accompanied by wife  c/o fatigue, ATKINSON, one recent episode of near syncope (but occured when he was recovering from flu)       4/1/2024 Pulm htn- remains on sildenafil 10mg tid doing well we will increase it  follows heart failure team- on lasix as needed  HST w/jil- cpap study pending  no pulm issues today  accomapnied by wife to this visit    5/3/2024- Pulm htn- Sildenafil 20 mg TID- does not have any symptoms today   followed by Dr. Wallace- Furosemide 20 mg as needed. Repeat cath 5/8/2024  HST- awaiting machine to be send home   accompanied with wife during the visit.   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 Post covid (12/2022) developed fatigue, ATKINSON. He is now referred by Dr. Mcconnell for a RHC/LHC Nitric oxide study.     ----Echo date--2023----LV EF 40-45%, RA and RV dilatation moderate tricuspid regurgitation PASP 73 MMHG  --CARDIAC MRI [facility]-2023---moderately reduced LV systolic function with evidence of RV pressure volume overload moderately dilated right ventricle with reduced systolic function no evidence of intracardiac shunt  ----Pft date---1/2024 normal study------  ----Ct scan date-[facility] 2023 -NO PE-----  ----Cath date--[facility] 2023--RA 16, PCWP 12 - PA 79/42 RV 79/23 , CO 3.01L/MIN, CI 1.49L/MIN/M2    HF: Dr. Wallace  Pulm: Dr. Mcconnell    Currently denies chest pain , dizziness, SOB. Not on O2 at home.

## 2024-05-08 NOTE — ASU PREOP CHECKLIST - ASSESSMENT, HISTORY & PHYSICAL COMPLETED AND ON MEDICAL RECORD
Refill policies:    Allow 2-3 business days for refills; controlled substances may take longer.  Contact your pharmacy at least 5 days prior to running out of medication and have them send an electronic request or submit request through the “request refill” option in your [employer] account.  Refills are not addressed on weekends; covering physicians do not authorize routine medications on weekends.  No narcotics or controlled substances are refilled after noon on Fridays or by on call physicians.  By law, narcotics must be electronically prescribed.  A 30 day supply with no refills is the maximum allowed.  If your prescription is due for a refill, you may be due for a follow up appointment.  To best provide you care, patients receiving routine medications need to be seen at least once a year.  Patients receiving narcotic/controlled substance medications need to be seen at least once every 3 months.  In the event that your preferred pharmacy does not have the requested medication in stock (e.g. Backordered), it is your responsibility to find another pharmacy that has the requested medication available.  We will gladly send a new prescription to that pharmacy at your request.    Scheduling Tests:    If your physician has ordered radiology tests such as MRI or CT scans, please contact Central Scheduling at [phone] right away to schedule the test.  Once scheduled, the [facility] Centralized Referral Team will work with your insurance carrier to obtain pre-certification or prior authorization.  Depending on your insurance carrier, approval may take 3-10 days.  It is highly recommended patients assure they have received an authorization before having a test performed.  If test is done without insurance authorization, patient may be responsible for the entire amount billed.      Precertification and Prior Authorizations:  If your physician has recommended that you have a procedure or additional testing performed the [facility]  Centralized Referral Team will contact your insurance carrier to obtain pre-certification or prior authorization.    You are strongly encouraged to contact your insurance carrier to verify that your procedure/test has been approved and is a COVERED benefit.  Although the [facility] Centralized Referral Team does its due diligence, the insurance carrier gives the disclaimer that \"Although the procedure is authorized, this does not guarantee payment.\"    Ultimately the patient is responsible for payment.   Thank you for your understanding in this matter.  Paperwork Completion:  If you require FMLA or disability paperwork for your recovery, please make sure to either drop it off or have it faxed to our office at [phone]. Be sure the form has your name and date of birth on it.  The form will be faxed to our Forms Department and they will complete it for you.  There is a 25$ fee for all forms that need to be filled out.  Please be aware there is a 10-14 day turnaround time.  You will need to sign a release of information (NILA) form if your paperwork does not come with one.  You may call the Forms Department with any questions at [phone].  Their fax number is [phone].      done

## 2024-05-08 NOTE — ASU DISCHARGE PLAN (ADULT/PEDIATRIC) - NS MD DC FALL RISK RISK
For information on Fall & Injury Prevention, visit: [email or web address] OR  [email or web address] OR  https://www.cdc.gov/steadi/patient.html

## 2024-05-08 NOTE — ASU DISCHARGE PLAN (ADULT/PEDIATRIC) - ASU DC SPECIAL INSTRUCTIONSFT
Wound Care:   the day AFTER your procedure remove bandage GENTLY, and clean using  mild soap and gentle warm, water stream, pat dry. leave OPEN to air. YOU MAY SHOWER   DO NOT apply lotions, creams, ointments, powder, perfumes to your incision site  DO NOT SOAK your site for 1 week ( no baths, no pools, no tubs, etc...)  Check  your groin and/or wrist daily. A small amount of bruising, and soreness are normal    ACTIVITY: for 24 hours   - DO NOT DRIVE  - DO NOT make any important decisions or sign legal documents   - DO NOT operate heavy machineries   - you may resume sexual activity in 48 hours, unless otherwise instructed by your cardiologist     If your procedure was done through the WRIST: for the NEXT 3 DAYS  - avoid pushing, pulling, with that affected wrist   - avoid repeated movement of that hand and wrist ( eg: typing, hammering)  - DO NOT LIFT anything more than 5 lbs     If your procedure was done through the GROIN: for the NEXT 5 DAYS  - Limit climbing stairs, DO NOT soak in bathtub or pool  - no strenuous activities, pushing, pulling, straining  - Do not lift anything 10lbs or heavier     MEDICATION:   take your medications as explained ( see discharge paperwork)   If you received a STENT, you will be taking antiplatelet medications to KEEP YOUR STENT OPEN ( eg: Aspirin, Plavix, Brilinta, Effient, etc).  Take as prescribed DO NOT STOP taking them without consulting with your cardiologist first.     Follow heart healthy diet recommended by your doctor, , if you smoke STOP SMOKING ( may call [phone] for center of tobacco control if you need assistance)     CALL your doctor to make appointment in 2 WEEKS     ***CALL YOUR DOCTOR***  if you experience: fever, chills, body aches, or severe pain, swelling, redness, heat or yellow discharge at incision site  If you experience Bleeding or excruciating pain at the procedural site, swelling (golf ball size) at your procedural site  If you experience CHEST PAIN  If you experience extremity numbness, tingling, temperature change (of your procedural site)   If you are unable to reach your doctor, you may contact:   -Cardiology Office at [facility] at [phone] or   - [facility] [phone]  - [facility] [phone]

## 2024-05-14 ENCOUNTER — NON-APPOINTMENT (OUTPATIENT)
Age: 49
End: 2024-05-14

## 2024-05-17 ENCOUNTER — APPOINTMENT (OUTPATIENT)
Dept: PULMONOLOGY | Facility: CLINIC | Age: 49
End: 2024-05-17
Payer: COMMERCIAL

## 2024-05-17 PROCEDURE — 99214 OFFICE O/P EST MOD 30 MIN: CPT

## 2024-05-21 ENCOUNTER — NON-APPOINTMENT (OUTPATIENT)
Age: 49
End: 2024-05-21

## 2024-05-23 PROBLEM — I27.20 PULMONARY HYPERTENSION, UNSPECIFIED: Chronic | Status: ACTIVE | Noted: 2024-05-08

## 2024-05-23 PROBLEM — Z86.2 PERSONAL HISTORY OF DISEASES OF THE BLOOD AND BLOOD-FORMING ORGANS AND CERTAIN DISORDERS INVOLVING THE IMMUNE MECHANISM: Chronic | Status: ACTIVE | Noted: 2024-05-08

## 2024-05-25 NOTE — REVIEW OF SYSTEMS
[Fever] : no fever [Dry Eyes] : no dry eyes [Ear Disturbance] : no ear disturbance [Cough] : no cough [Dyspnea] : no dyspnea [Chest Discomfort] : no chest discomfort [Hay Fever] : no hay fever [GERD] : no gerd [Nocturia] : no nocturia [Arthralgias] : no arthralgias [Raynaud] : no raynaud [Anemia] : no anemia [Headache] : no headache [Obesity] : no obesity [Depression] : no depression

## 2024-05-25 NOTE — END OF VISIT
[FreeTextEntry3] :   I, Dr. Waldemar Mcconnell  personally performed the evaluation and management (E/M) services for this established patient who presents today with (a) new problem(s)/exacerbation of (an) existing condition(s). That E/M includes conducting the clinically appropriate interval history &/or exam, assessing all new/exacerbated conditions, and establishing a new plan of care. Today, my АЛЕКСАНДР, Ligia Lawrence NP, , was here to observe my evaluation and management service for this new problem/exacerbated condition and follow the plan of care established by me going forward. [Time Spent: ___ minutes] : I have spent [unfilled] minutes of time on the encounter.

## 2024-05-25 NOTE — HISTORY OF PRESENT ILLNESS
[Home] : at home, [unfilled] , at the time of the visit. [Medical Office: ([facility])___] : at the medical office located in  [Verbal consent obtained from patient] : the patient, [unfilled] [Never] : never [Obstructive Sleep Apnea] : obstructive sleep apnea [TextBox_4] : This letter  is regarding your patient  who  attended pulmonary out patient office today.  I have reviewed  patient's  past history, social history, family history and medication list. I also  reviewed nurse practitioners/ and fellows  notes and assessment and agree with it.   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 Eval by PMD showed tachycardia which led to cardiology consult- echo showed elevated estimated pa pressures- Admitted to [facility] last month and underwent cardiac cath    ------No history of , fever, chills , rigors, chest pain, or hemoptysis. Questionable history of Raynaud's phenomenon. No h/o significant weight loss in last few months. No history of liver dysfunction , collagen vascular disorder or chronic thromboembolic disease. I would classify the patient's dyspnea as WHO  FUNCTIONAL CLASS II--------  ----Echo  date--2023----LV EF 40-45%, RA and RV dilatation moderate tricuspid regurgitation PASP 73 [facility] --CARDIAC MRI  [facility]-2023---moderately reduced LV systolic function with evidence of RV pressure volume overload moderately dilated right ventricle with reduced systolic function no evidence of intracardiac shunt ----Pft date---1/2024 normal study------ ----Ct scan date-[facility] 2023 -NO PE----- ----Cath date--[facility] 2023--RA 16, PCWP 12 - PA 79/42  RV 79/23 , CO 3.01L/MIN, CI 1.49L/MIN/M2   1/2024 accompanied by wife c/o fatigue, ATKINSON, one recent episode of near syncope (but occured when he was recovering from flu)    4/1/2024 Pulm htn- remains on sildenafil 10mg tid doing well we will increase it follows heart failure team- on lasix as needed HST w/jil- cpap study pending no pulm issues today accomapnied by wife to this visit  5/3/2024- Pulm htn- Sildenafil 20 mg TID- does not have any symptoms today  followed by Dr. Wallace- Furosemide 20 mg as needed.  Repeat cath 5/8/2024 HST- awaiting machine to be send home  accompanied with wife during the visit   5/2024 cardiac cath Moderate pre-capillary pulmonary hypertension (sPAP 58mmHg, dPAP 24mmHg, mPAP 35mmHg, PVR 4.11Wu) PCWP = 8mmHg with a V wave of 10mmHg  PAsat = 82.5% // AOsat = 99.9%   [place] CO // CI = 6.57l/min // 3.25l/min/m2  SBP = 104/66/80   5/2024  TEB post cardiac cath to review results Pulm htn: on  Sildenafil 20 mg TID and diuretics, feels well jil- awaits cpap delivery [TextBox_100] : 3/2024 [TextBox_108] : 11.8 [TextBox_112] : 82.2

## 2024-05-25 NOTE — DISCUSSION/SUMMARY
[FreeTextEntry1] : ---Assessment plan----------The patient has been referred here for further opinion regarding pulmonary problem, 47 yo referred for eval of PULMONARY HYPERTENSION,  H/O SPLENECTOMY FOR ITP-- PMH ITP, renal calculi, essential tremors 1) PULM HTN-----possibly realted to splenectomy--ON  SILDENAFIL--20 MG PO TID  ---right heart cath does show evidence of precapillary pulmonary hypertension. Will start OPSUMIT 10mg daily- await insurance approval. Keep euvolemic. On lasix 2) HST  with KAREN---awaiting CPAP  3) advised no work until eval completed 4 )-DR ELIZAB[facility] patient's cardiologist has also started patient on metoprolol and Entresto 5) f/u 8 weeks  6) needs CPET  Thanks for allowing  me to participate  in the care of this patient.  Patient at this time  will follow  the above mentioned recommendations and return back for follow up visit. If you have any questions  I can be reached  at # [phone] (office).  Waldemar Mcconnell MD, [facility]  Pulmonary, Critical Care and Sleep Medicine

## 2024-06-04 RX ORDER — MACITENTAN 10 MG/1
10 TABLET, FILM COATED ORAL
Qty: 30 | Refills: 3 | Status: ACTIVE | COMMUNITY
Start: 2024-05-17 | End: 1900-01-01

## 2024-06-19 LAB
HGB BLDA-MCNC: 17.4 G/DL — SIGNIFICANT CHANGE UP (ref 12.6–17.4)
HGB BLDA-MCNC: 17.7 G/DL — HIGH (ref 12.6–17.4)
HGB FLD-MCNC: 16.9 G/DL — SIGNIFICANT CHANGE UP (ref 12.6–17.4)
HGB FLD-MCNC: 17.4 G/DL — SIGNIFICANT CHANGE UP (ref 12.6–17.4)
HGB FLD-MCNC: 17.4 G/DL — SIGNIFICANT CHANGE UP (ref 12.6–17.4)
HGB FLD-MCNC: 17.5 G/DL — HIGH (ref 12.6–17.4)
HGB FLD-MCNC: 17.5 G/DL — HIGH (ref 12.6–17.4)
HGB FLD-MCNC: 17.6 G/DL — HIGH (ref 12.6–17.4)
OXYHGB MFR BLDA: 94 % — SIGNIFICANT CHANGE UP (ref 90–95)
OXYHGB MFR BLDA: 97.8 % — HIGH (ref 90–95)
OXYHGB MFR BLDMV: 75.5 % — LOW (ref 90–95)
OXYHGB MFR BLDMV: 77.6 % — LOW (ref 90–95)
OXYHGB MFR BLDMV: 78.5 % — LOW (ref 90–95)
OXYHGB MFR BLDMV: 78.5 % — LOW (ref 90–95)
OXYHGB MFR BLDMV: 79.6 % — LOW (ref 90–95)
OXYHGB MFR BLDMV: 80.8 % — LOW (ref 90–95)
SAO2 % BLD: 77.3 % — SIGNIFICANT CHANGE UP (ref 60–90)
SAO2 % BLD: 79.3 % — SIGNIFICANT CHANGE UP (ref 60–90)
SAO2 % BLD: 80.3 % — SIGNIFICANT CHANGE UP (ref 60–90)
SAO2 % BLD: 80.3 % — SIGNIFICANT CHANGE UP (ref 60–90)
SAO2 % BLD: 81.5 % — SIGNIFICANT CHANGE UP (ref 60–90)
SAO2 % BLD: 82.5 % — SIGNIFICANT CHANGE UP (ref 60–90)
SAO2 % BLDA: 96.2 % — SIGNIFICANT CHANGE UP (ref 94–98)
SAO2 % BLDA: 99.9 % — HIGH (ref 94–98)

## 2024-06-28 ENCOUNTER — NON-APPOINTMENT (OUTPATIENT)
Age: 49
End: 2024-06-28

## 2024-07-08 ENCOUNTER — NON-APPOINTMENT (OUTPATIENT)
Age: 49
End: 2024-07-08

## 2024-07-08 ENCOUNTER — APPOINTMENT (OUTPATIENT)
Dept: CARDIOLOGY | Facility: CLINIC | Age: 49
End: 2024-07-08
Payer: COMMERCIAL

## 2024-07-08 VITALS
WEIGHT: 186 LBS | OXYGEN SATURATION: 99 % | HEIGHT: 71 IN | HEART RATE: 57 BPM | SYSTOLIC BLOOD PRESSURE: 118 MMHG | BODY MASS INDEX: 26.04 KG/M2 | DIASTOLIC BLOOD PRESSURE: 77 MMHG

## 2024-07-08 DIAGNOSIS — I42.8 OTHER CARDIOMYOPATHIES: ICD-10-CM

## 2024-07-08 PROCEDURE — 99204 OFFICE O/P NEW MOD 45 MIN: CPT | Mod: 25

## 2024-07-08 PROCEDURE — 96040: CPT

## 2024-07-08 PROCEDURE — 93000 ELECTROCARDIOGRAM COMPLETE: CPT

## 2024-07-09 ENCOUNTER — RESULT REVIEW (OUTPATIENT)
Age: 49
End: 2024-07-09

## 2024-07-09 ENCOUNTER — OUTPATIENT (OUTPATIENT)
Dept: OUTPATIENT SERVICES | Facility: HOSPITAL | Age: 49
LOS: 1 days | End: 2024-07-09
Payer: COMMERCIAL

## 2024-07-09 ENCOUNTER — APPOINTMENT (OUTPATIENT)
Dept: CV DIAGNOSITCS | Facility: HOSPITAL | Age: 49
End: 2024-07-09

## 2024-07-09 DIAGNOSIS — I27.20 PULMONARY HYPERTENSION, UNSPECIFIED: ICD-10-CM

## 2024-07-09 PROCEDURE — 93306 TTE W/DOPPLER COMPLETE: CPT | Mod: 26

## 2024-07-16 ENCOUNTER — APPOINTMENT (OUTPATIENT)
Dept: PULMONOLOGY | Facility: CLINIC | Age: 49
End: 2024-07-16

## 2024-07-16 VITALS
SYSTOLIC BLOOD PRESSURE: 118 MMHG | DIASTOLIC BLOOD PRESSURE: 73 MMHG | BODY MASS INDEX: 27.18 KG/M2 | HEIGHT: 70.5 IN | HEART RATE: 60 BPM | WEIGHT: 192 LBS | OXYGEN SATURATION: 96 % | TEMPERATURE: 98.2 F

## 2024-07-16 DIAGNOSIS — D69.3 IMMUNE THROMBOCYTOPENIC PURPURA: ICD-10-CM

## 2024-07-16 DIAGNOSIS — I27.20 PULMONARY HYPERTENSION, UNSPECIFIED: ICD-10-CM

## 2024-07-16 DIAGNOSIS — G47.33 OBSTRUCTIVE SLEEP APNEA (ADULT) (PEDIATRIC): ICD-10-CM

## 2024-07-16 PROCEDURE — 36415 COLL VENOUS BLD VENIPUNCTURE: CPT

## 2024-07-16 PROCEDURE — 99214 OFFICE O/P EST MOD 30 MIN: CPT | Mod: 25

## 2024-07-17 LAB
ALBUMIN SERPL ELPH-MCNC: 4.7 G/DL
ALP BLD-CCNC: 74 U/L
ALT SERPL-CCNC: 27 U/L
ANION GAP SERPL CALC-SCNC: 15 MMOL/L
AST SERPL-CCNC: 20 U/L
BILIRUB SERPL-MCNC: 0.8 MG/DL
BUN SERPL-MCNC: 11 MG/DL
CALCIUM SERPL-MCNC: 9.5 MG/DL
CHLORIDE SERPL-SCNC: 103 MMOL/L
CO2 SERPL-SCNC: 21 MMOL/L
CREAT SERPL-MCNC: 0.95 MG/DL
EGFR: 98 ML/MIN/1.73M2
GLUCOSE SERPL-MCNC: 79 MG/DL
HCT VFR BLD CALC: 48.6 %
HGB BLD-MCNC: 16.3 G/DL
MCHC RBC-ENTMCNC: 29.4 PG
MCHC RBC-ENTMCNC: 33.5 GM/DL
MCV RBC AUTO: 87.6 FL
NT-PROBNP SERPL-MCNC: 79 PG/ML
PLATELET # BLD AUTO: 238 K/UL
POTASSIUM SERPL-SCNC: 4.3 MMOL/L
PROT SERPL-MCNC: 7.1 G/DL
RBC # BLD: 5.55 M/UL
RBC # FLD: 15.6 %
SODIUM SERPL-SCNC: 139 MMOL/L
WBC # FLD AUTO: 9.76 K/UL

## 2024-07-18 ENCOUNTER — APPOINTMENT (OUTPATIENT)
Dept: PULMONOLOGY | Facility: CLINIC | Age: 49
End: 2024-07-18
Payer: COMMERCIAL

## 2024-07-18 LAB
DNA PLOIDY SPEC FC-IMP: NORMAL
MISCELLANEOUS TEST: NORMAL
PROT C PPP CHRO-ACNC: 97 %
PROT S AG ACT/NOR PPP IA: 84 %
PROT S FREE PPP-ACNC: 111 %
PTR INTERP: NORMAL

## 2024-07-18 PROCEDURE — 94621 CARDIOPULM EXERCISE TESTING: CPT

## 2024-07-18 PROCEDURE — 94375 RESPIRATORY FLOW VOLUME LOOP: CPT

## 2024-08-04 ENCOUNTER — NON-APPOINTMENT (OUTPATIENT)
Age: 49
End: 2024-08-04

## 2024-08-14 ENCOUNTER — NON-APPOINTMENT (OUTPATIENT)
Age: 49
End: 2024-08-14

## 2024-08-15 ENCOUNTER — APPOINTMENT (OUTPATIENT)
Dept: HEART FAILURE | Facility: CLINIC | Age: 49
End: 2024-08-15
Payer: COMMERCIAL

## 2024-08-15 ENCOUNTER — NON-APPOINTMENT (OUTPATIENT)
Age: 49
End: 2024-08-15

## 2024-08-15 VITALS
SYSTOLIC BLOOD PRESSURE: 108 MMHG | DIASTOLIC BLOOD PRESSURE: 71 MMHG | HEIGHT: 70.5 IN | OXYGEN SATURATION: 98 % | WEIGHT: 191 LBS | BODY MASS INDEX: 27.04 KG/M2 | HEART RATE: 64 BPM

## 2024-08-15 PROCEDURE — G2211 COMPLEX E/M VISIT ADD ON: CPT | Mod: NC

## 2024-08-15 PROCEDURE — 99215 OFFICE O/P EST HI 40 MIN: CPT | Mod: 25

## 2024-08-15 PROCEDURE — 93000 ELECTROCARDIOGRAM COMPLETE: CPT

## 2024-08-15 RX ORDER — SPIRONOLACTONE 25 MG/1
25 TABLET ORAL DAILY
Qty: 30 | Refills: 5 | Status: ACTIVE | COMMUNITY
Start: 2024-08-15 | End: 1900-01-01

## 2024-08-18 NOTE — ASSESSMENT
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 Mcconnell), HFrecEF (EF initially 40%, LVEDD 3.8 cm now improved to 50%), polycythemia (? secondary; Hb improved from 19 to 16), KAREN (Dx 3/24; on CPAP), prior Covid (12/22) who presents for f/u of care. Currently appears euvolemic and well compensated with NYHA class I symptoms and normotensive. Unclear etiology of pulm HTN but possibility raised for group I and may be associated with prior splenectomy (however no evidence of thromboembolic disease).    1. HFrecEF/NICM - likely initial LV dysfunction was due to severe RV dysfunction and now LV function has normalized - c/w Entresto 49/51 twice/day for now; unclear if truly should continue but given recovery and tolerance will continue - c/w Farxiga 10 mg daily - c/w toprol 50 mg daily -will start spironolactone 25 mg QD (K4.3 BUN/Cr 11/0.95 July 17, 2024)  - c/w lasix prn  - repeat TTE in July with improved EF 62% and no TR jet to assess pulmonary pressures - counseled on disease process  - maintain log of weight/BP  - genetic testing done; VUS of ABCC9 gene (unlikely relevant)   2. Pulm HTN - unclear etiology; ? 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 KAREN, mild -con CPAP -Hgb improved 16.3   4. Work clearance - Defer to pulmonary - will investigate further potential effects  RTC 6 months with me

## 2024-08-18 NOTE — PHYSICAL EXAM
[Well Developed] : well developed [Well Nourished] : well nourished [No Acute Distress] : no acute distress [Normal Conjunctiva] : normal conjunctiva [No Carotid Bruit] : no carotid bruit [Normal S1, S2] : normal S1, S2 [No Murmur] : no murmur [No Rub] : no rub [No Gallop] : no gallop [Clear Lung Fields] : clear lung fields [Good Air Entry] : good air entry [No Respiratory Distress] : no respiratory distress  [Soft] : abdomen soft [Non Tender] : non-tender [No Masses/organomegaly] : no masses/organomegaly [Normal Bowel Sounds] : normal bowel sounds [Normal Gait] : normal gait [No Edema] : no edema [No Cyanosis] : no cyanosis [No Varicosities] : no varicosities [No Rash] : no rash [No Skin Lesions] : no skin lesions [Moves all extremities] : moves all extremities [No Focal Deficits] : no focal deficits [Normal Speech] : normal speech [Alert and Oriented] : alert and oriented [Normal memory] : normal memory [de-identified] : JVP approx 6-8  [de-identified] : mild tremulousness (chronic) [de-identified] : RRR; no prominent P2; no m/r/g; no RV heave [de-identified] : CTAB [de-identified] : no clubbing

## 2024-08-18 NOTE — PHYSICAL EXAM
[Well Developed] : well developed [Well Nourished] : well nourished [No Acute Distress] : no acute distress [Normal Conjunctiva] : normal conjunctiva [No Carotid Bruit] : no carotid bruit [Normal S1, S2] : normal S1, S2 [No Murmur] : no murmur [No Rub] : no rub [No Gallop] : no gallop [Clear Lung Fields] : clear lung fields [Good Air Entry] : good air entry [No Respiratory Distress] : no respiratory distress  [Soft] : abdomen soft [Non Tender] : non-tender [No Masses/organomegaly] : no masses/organomegaly [Normal Bowel Sounds] : normal bowel sounds [Normal Gait] : normal gait [No Edema] : no edema [No Cyanosis] : no cyanosis [No Varicosities] : no varicosities [No Rash] : no rash [No Skin Lesions] : no skin lesions [Moves all extremities] : moves all extremities [No Focal Deficits] : no focal deficits [Normal Speech] : normal speech [Alert and Oriented] : alert and oriented [Normal memory] : normal memory [de-identified] : mild tremulousness (chronic) [de-identified] : JVP approx 6-8  [de-identified] : RRR; no prominent P2; no m/r/g; no RV heave [de-identified] : CTAB [de-identified] : no clubbing

## 2024-08-18 NOTE — PHYSICAL EXAM
[Well Developed] : well developed [Well Nourished] : well nourished [No Acute Distress] : no acute distress [Normal Conjunctiva] : normal conjunctiva [No Carotid Bruit] : no carotid bruit [Normal S1, S2] : normal S1, S2 [No Murmur] : no murmur [No Rub] : no rub [No Gallop] : no gallop [Clear Lung Fields] : clear lung fields [Good Air Entry] : good air entry [No Respiratory Distress] : no respiratory distress  [Soft] : abdomen soft [Non Tender] : non-tender [No Masses/organomegaly] : no masses/organomegaly [Normal Bowel Sounds] : normal bowel sounds [Normal Gait] : normal gait [No Edema] : no edema [No Cyanosis] : no cyanosis [No Varicosities] : no varicosities [No Rash] : no rash [No Skin Lesions] : no skin lesions [Moves all extremities] : moves all extremities [No Focal Deficits] : no focal deficits [Normal Speech] : normal speech [Alert and Oriented] : alert and oriented [Normal memory] : normal memory [de-identified] : JVP approx 6-8  [de-identified] : mild tremulousness (chronic) [de-identified] : RRR; no prominent P2; no m/r/g; no RV heave [de-identified] : CTAB [de-identified] : no clubbing

## 2024-08-18 NOTE — CARDIOLOGY SUMMARY
[de-identified] : 8/15/24 - NSR, nl axis, no evident RV strain  4/19/24 NSR 66, nl axis, TWI V1-V4, II/III/aVF, RV strain, no change 2/8/24 - NSR, nl axis, TWI V1-V4, II/III/aVF [de-identified] : 7/9/24 - EF nl, mod RV dilatation/dysfunction, LVOT VTI 17 cm, no TR jet to assess pulmonary pressures, IVC 1.8 cm  3/19/24 - EF read as 42% (appears closer to 45-50%), LVEDD 4.6 cm, E/e' 6, no diastolic dysfunction, nl LA size, mod-severe RV dilatation/dysfunction, mild TR (approximate PASP 50), ? mid-systolic notching in PW of RVOT, D shape RV in systole c/w pressure overload, LVOT VTI 13 cm, IVC 1.9 cm 12/20/2023 - EF 40-45%, LVEDD 3.8 cm, septum 1.3 cm, PW 1.2 cm, E/e' 6, LVOT VTI 9.8 cm, RA and RV dilatation, mod TR, PASP 73 [de-identified] : 12/21/23 - [facility] - RA 16, RV 79/23, PA 79/42/55, PCWP 12, LVEDP 12, CO/CI 3/1.49, TPG 43, DPG 30, PVR 14; vasodilator testing done - Fransisca 40  w/o drop in PA pressures sat run - SVC 58%, IVC 62%, RV 58%, PA 64% (no significant step up); Qp/Qs 0.87 LHC - minor irregularities [de-identified] : 2023 - MRI ([place]) - mod LV dysfunction (40-45%), LVEDD 3.8 cm, septum 1.3, PW 1.2 cm, nl LA size, mod-severe RV dilatation, mild RV dysfunction with Kaba's sign, no PFO, RV pressure/volume overload; no intracardiac shunt; E/e' 10, LVOT VTI 9.8 cm, mod TR, RVSP 73,   2023 - CT scan ([place]) - no PE

## 2024-08-18 NOTE — CARDIOLOGY SUMMARY
[de-identified] : 8/15/24 - NSR, nl axis, no evident RV strain  4/19/24 NSR 66, nl axis, TWI V1-V4, II/III/aVF, RV strain, no change 2/8/24 - NSR, nl axis, TWI V1-V4, II/III/aVF [de-identified] : 7/9/24 - EF nl, mod RV dilatation/dysfunction, LVOT VTI 17 cm, no TR jet to assess pulmonary pressures, IVC 1.8 cm  3/19/24 - EF read as 42% (appears closer to 45-50%), LVEDD 4.6 cm, E/e' 6, no diastolic dysfunction, nl LA size, mod-severe RV dilatation/dysfunction, mild TR (approximate PASP 50), ? mid-systolic notching in PW of RVOT, D shape RV in systole c/w pressure overload, LVOT VTI 13 cm, IVC 1.9 cm 12/20/2023 - EF 40-45%, LVEDD 3.8 cm, septum 1.3 cm, PW 1.2 cm, E/e' 6, LVOT VTI 9.8 cm, RA and RV dilatation, mod TR, PASP 73 [de-identified] : 12/21/23 - [facility] - RA 16, RV 79/23, PA 79/42/55, PCWP 12, LVEDP 12, CO/CI 3/1.49, TPG 43, DPG 30, PVR 14; vasodilator testing done - Fransisca 40  w/o drop in PA pressures sat run - SVC 58%, IVC 62%, RV 58%, PA 64% (no significant step up); Qp/Qs 0.87 LHC - minor irregularities [de-identified] : 2023 - MRI ([place]) - mod LV dysfunction (40-45%), LVEDD 3.8 cm, septum 1.3, PW 1.2 cm, nl LA size, mod-severe RV dilatation, mild RV dysfunction with Kaba's sign, no PFO, RV pressure/volume overload; no intracardiac shunt; E/e' 10, LVOT VTI 9.8 cm, mod TR, RVSP 73,   2023 - CT scan ([place]) - no PE

## 2024-08-18 NOTE — HISTORY OF PRESENT ILLNESS
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 Mcconnell), HFrecEF (EF initially 40%, LVEDD 3.8 cm now improved to 50%), polycythemia (? secondary; Hb improved from 19 to 16), KAREN (Dx 3/24; on CPAP), prior Covid (12/22) who presents for f/u of care. Of note, he has a childhood tremor. Referred by Dr. Mcconnell.   For full initial details, please refer to note from 2/8/24.   Since last visit, reports doing well. Has been on dual therapy for PH (sildenafil, opsumit) with improvement.   Reports home weight is 184 lbs (ranging 182-185) and has taken furosemide 20 mg at times when feels "lump in throat." Has only taken 5 times since last visit.   Continues to exercise on treadmill for 30-45 minutes 2-3 times a week and tolerating gradual increase in activity. Able to go up 2 flights of stairs without issues. Sleeping CPAP now with 1 pillow. Prior cough resolved.   Wants to return to work as  but discussed at length the potential risks and uncertainty how this would impact his pulmonary pressures as at higher altitude, air is less dense and oxygen levels are lower which theoretically could increase pulmonary pressures from relative hypoxia. Would like to investigate further.   Recently denies orthopnea/PND, SOB  Denies chest pain, palpitations, dizziness/LH, syncope and he does not have an ICD.  Seen by Dr. Mcconnell 1/29/24 where pulmonary HTN workup was started: 2/5/24 - V/Q scan low probability 1/29/24 - CT - no LAD; no PE; "unremarkable" 1/30/24 - serologies negative 1/29/24 - FEV1 4, FVC 5.2, FEV1/FVC 78%, DLCO 104% 3/4/24 - sleep study - mild KAREN with mod O2 desaturation; jose 82%

## 2024-08-18 NOTE — HISTORY OF PRESENT ILLNESS
[FreeTextEntry1] : Briefly, 47 y/o M w/ h/o ITP s/p splenectomy (1990s), pulmonary HTN (unclear etiology; followed by Dr. Mcconnell), HFrecEF (EF initially 40%, LVEDD 3.8 cm now improved to 50%), polycythemia (? secondary; Hb improved from 19 to 16), KAREN (Dx 3/24; on CPAP), prior Covid (12/22) who presents for f/u of care. Of note, he has a childhood tremor. Referred by Dr. Mcconnell.   For full initial details, please refer to note from 2/8/24.   Since last visit, reports doing well. Has been on dual therapy for PH (sildenafil, opsumit) with improvement.   Reports home weight is 184 lbs (ranging 182-185) and has taken furosemide 20 mg at times when feels "lump in throat." Has only taken 5 times since last visit.   Continues to exercise on treadmill for 30-45 minutes 2-3 times a week and tolerating gradual increase in activity. Able to go up 2 flights of stairs without issues. Sleeping CPAP now with 1 pillow. Prior cough resolved.   Wants to return to work as  but discussed at length the potential risks and uncertainty how this would impact his pulmonary pressures as at higher altitude, air is less dense and oxygen levels are lower which theoretically could increase pulmonary pressures from relative hypoxia. Would like to investigate further.   Recently denies orthopnea/PND, SOB  Denies chest pain, palpitations, dizziness/LH, syncope and he does not have an ICD.  Seen by Dr. Mcconnell 1/29/24 where pulmonary HTN workup was started: 2/5/24 - V/Q scan low probability 1/29/24 - CT - no LAD; no PE; "unremarkable" 1/30/24 - serologies negative 1/29/24 - FEV1 4, FVC 5.2, FEV1/FVC 78%, DLCO 104% 3/4/24 - sleep study - mild KAREN with mod O2 desaturation; jose 82%

## 2024-08-18 NOTE — CARDIOLOGY SUMMARY
[de-identified] : 8/15/24 - NSR, nl axis, no evident RV strain  4/19/24 NSR 66, nl axis, TWI V1-V4, II/III/aVF, RV strain, no change 2/8/24 - NSR, nl axis, TWI V1-V4, II/III/aVF [de-identified] : 7/9/24 - EF nl, mod RV dilatation/dysfunction, LVOT VTI 17 cm, no TR jet to assess pulmonary pressures, IVC 1.8 cm  3/19/24 - EF read as 42% (appears closer to 45-50%), LVEDD 4.6 cm, E/e' 6, no diastolic dysfunction, nl LA size, mod-severe RV dilatation/dysfunction, mild TR (approximate PASP 50), ? mid-systolic notching in PW of RVOT, D shape RV in systole c/w pressure overload, LVOT VTI 13 cm, IVC 1.9 cm 12/20/2023 - EF 40-45%, LVEDD 3.8 cm, septum 1.3 cm, PW 1.2 cm, E/e' 6, LVOT VTI 9.8 cm, RA and RV dilatation, mod TR, PASP 73 [de-identified] : 2023 - MRI ([place]) - mod LV dysfunction (40-45%), LVEDD 3.8 cm, septum 1.3, PW 1.2 cm, nl LA size, mod-severe RV dilatation, mild RV dysfunction with Kaba's sign, no PFO, RV pressure/volume overload; no intracardiac shunt; E/e' 10, LVOT VTI 9.8 cm, mod TR, RVSP 73,   2023 - CT scan ([place]) - no PE [de-identified] : 12/21/23 - [facility] - RA 16, RV 79/23, PA 79/42/55, PCWP 12, LVEDP 12, CO/CI 3/1.49, TPG 43, DPG 30, PVR 14; vasodilator testing done - Fransisca 40  w/o drop in PA pressures sat run - SVC 58%, IVC 62%, RV 58%, PA 64% (no significant step up); Qp/Qs 0.87 LHC - minor irregularities

## 2024-08-22 ENCOUNTER — RX RENEWAL (OUTPATIENT)
Age: 49
End: 2024-08-22

## 2024-09-17 ENCOUNTER — NON-APPOINTMENT (OUTPATIENT)
Age: 49
End: 2024-09-17

## 2024-10-04 ENCOUNTER — TRANSCRIPTION ENCOUNTER (OUTPATIENT)
Age: 49
End: 2024-10-04

## 2024-10-04 ENCOUNTER — NON-APPOINTMENT (OUTPATIENT)
Age: 49
End: 2024-10-04

## 2024-10-12 ENCOUNTER — NON-APPOINTMENT (OUTPATIENT)
Age: 49
End: 2024-10-12

## 2024-10-21 ENCOUNTER — APPOINTMENT (OUTPATIENT)
Dept: CARDIOLOGY | Facility: CLINIC | Age: 49
End: 2024-10-21
Payer: COMMERCIAL

## 2024-10-21 ENCOUNTER — NON-APPOINTMENT (OUTPATIENT)
Age: 49
End: 2024-10-21

## 2024-10-21 VITALS
DIASTOLIC BLOOD PRESSURE: 74 MMHG | WEIGHT: 182 LBS | HEART RATE: 66 BPM | OXYGEN SATURATION: 99 % | SYSTOLIC BLOOD PRESSURE: 116 MMHG | BODY MASS INDEX: 25.75 KG/M2

## 2024-10-21 PROCEDURE — 96040: CPT

## 2024-10-21 PROCEDURE — 99215 OFFICE O/P EST HI 40 MIN: CPT | Mod: 25

## 2024-10-21 PROCEDURE — 93000 ELECTROCARDIOGRAM COMPLETE: CPT

## 2024-10-21 NOTE — DISCUSSION/SUMMARY
[TextEntry] : We reviewed the risks, benefits, limitations, and implications of genetic testing.  Additionally, we examined the patients motivation for testing, and the emotional ramifications of the test results.  The patient is aware that results may impact family members.  Counseling resources are available if requested.   [place], the Genetic Information Non-discrimination Act protects most people from discrimination in health insurance and employment at firms with over 50 employees.  ELLIOTT does not protect against use of genetic information by life insurance, disability, or long-term care insurers.  Further information on other limitations is available at www.iCabbiNAhelp.org.   After a review of testing options, the patient elected to the combined cardiac  panel offered through [facility]. . Panels contain 138 genes associated with varying levels of risk for cardiomyopathy.  Consent also obtained for the pulm HTN panel 8 genes.  Will also check hypecoag workup as brother with hx PE and he has RT heart failure . We reviewed the three possible results for each gene on this panel: positive, negative and variant of uncertain significance (VUS).  We discussed that panel testing could result in incidental findings, such as identifying a positive result in a gene with cardiac risks not seen in the current personal or discussed family history. If results are positive, we would discuss the  risks and management options associated with that cardiac condition susceptibility gene and discuss genetic testing for family members.  Pedigree was reviewed with the patient to identify those who should be tested if the patient is positive.  If results are negative or a VUS is identified, the patient would continue to be managed based on personal and family history of their  cardiac condition.

## 2024-10-21 NOTE — FAMILY HISTORY
[FreeTextEntry1] : FamilyHistory_20_twCiteListControlStart FamilyHistory_20_twCiteListControlEnd [id number]  A four-generation family history was constructed and scanned into [employer].  Family history is significant for:  siblings  brother hx PE  sister hx syncope, + tilt table PTS  Mother Maternal aunts Maternal uncles Maternal Grandmother Maternal Grandfather  Father Paternal aunts Paternal uncles Paternal Grandfather Paternal Grandmother  children:  his maternal families originate from [place] and paternal families originate from indicate country or region.  No Ashkenazi Caodaism ancestry.  Family history was positive/ negative for consanguinity   No family history of SIDS

## 2024-10-21 NOTE — HISTORY OF PRESENT ILLNESS
[FreeTextEntry1] : ENRIQUE JULES is a 47 yo F PMH NICM, EF 40%, pulm HTN, RV dilatation , primary pulmonary HTN  states diagnosed 12/23  in 12/22 had covid then easy fatigue and underwent a workup that found the RV failure and subsequent LV reduced EF.  he underwent genetic testing and today presents for results   of note the pulm HTN testing was cancelled by [facility] as the lab has discontinued that test panel. Will send another sample to [address]

## 2024-10-21 NOTE — REASON FOR VISIT
[FreeTextEntry3] : Dear Dr. Ervin, Dr. Carl        . I saw your patient ENRIQUE JULES on 10/21/2024 . Please see the note below for the assessment and plan.   ENRIQUE JULES  was seen  for an initial consultation at the Cardiogenomics Program at [facility] on 07/08/2024.   Mr. JULES was referred by  Dr. Ervin for hereditary cardiac predisposition risk assessment and counseling, due to( hx NICM and pulm HTN

## 2024-10-21 NOTE — RESULTS/DATA
[TextEntry] : Combined Cardiac Sequencing and Deletion/Duplication Panel- negative for any pathogenic or likely pathogenic variants in any of the 138 genes tested.  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

## 2024-10-21 NOTE — ASSESSMENT
[TextEntry] : ENRIQUE JULES  is a 48 year M  with a history of NICM, EF 40%, pulm HTN, RV dilatation , primary pulmonary HTN . The differences between hereditary and sporadic cardiomyopathy, RV failure, LV failure and pulmonary htn were reviewed with the patient.  He  has elected to undergo genetic testing due to concerns for  personal history, definitive diagnosis, disease management, family history, concern for the health of family members . Results may change medical management for the patient and may affect the healthcare of other family members. He  expressed understanding of the presented information and satisfaction with having all of His questions and concerns addressed. He underwent genetic testing for NNICM, results identified a variant of uncertain significance (VUS) in ABCC9 gene that is of low clinical suspicion  the limitations of genetic testing were discussed with ENRIQUE JULES   for him continue medical care as per his cardiology team  for his family At this time we recommend all of his first degree relatives undergo a clinical cardiac evaluation with history, physical exam, EKG and ECHO. If their initial clinical evaluation is normal they should continued to have clinical cardiac evaluation with imaging every 3-5 years (for adults).    Genetic knowledge changes rapidly.  We encourage re-contacting the cardiogenomics program at [facility] if there are significant changes in family or personal health, and annually. ENRIQUE JULES expressed understanding of the presented information and satisfaction with having his questions and concerns addressed.   EKG performed today for the above diagnosis.

## 2024-10-21 NOTE — SIGNATURES
[TextEntry] : Juanjo Olivo MD, PhD  Medical Director Program for Cardiac Genetics, Genomics and Precision Medicine Department of Cardiology [facility]  [employer] at [address] [place] [address] Tel: [phone] Fax: [phone]  ([facility] office) [address], 3rd floor (between [address]) [place] [address] (p) [phone] (f) [phone]

## 2024-10-21 NOTE — DISCUSSION/SUMMARY
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 Carl), HFimpEF (EF initially 40%, LVEDD 3.8 cm now improved to 50%), polycythemia (? secondary; Hb 19), SUE (Dx 3/24; on CPAP), prior Covid (12/22)   Patient underwent genetic testing and presents today for results.   RESULTS Combined Cardiac Sequencing and Deletion/Duplication Panel- negative for any pathogenic or likely pathogenic variants in any of the 138 genes tested. 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    ABCC9 Autosomal dominant c.2359 G>A p.([id number]) Heterozygous Variant of Uncertain Significance The ABCC9 gene, also known as SUR2, encodes a protein that is part of an ATP-sensitive potassium channel complex (PMID: 14468381). ABCC9 encodes two isoforms, SUR2A and SUR2B, with the SUR2A isoform expressed in cardiac and skeletal muscle and the SUR2B isoform expressed in vascular smooth muscle and hair follicles (PMID: 96915286). Pathogenic variants in both isoforms of the ABCC9 gene cause autosomal dominant hypertrichotic osteochondrodysplasia, also known as Cantu syndrome, or the related disorders acromegaloid facial appearance (AFA) and hypertrichosis with acromegaloid facial features (HAFF) (PMID: 96863637).  The specific variant identified in Mr Panda,  p.([id number]) (GCC>ACC): c.2359 G>A in exon 19 of the ABCC9 gene ([id number]) --Not observed at a significant frequency in large population cohorts (gnomAD) --In silico analysis supports that this missense variant has a deleterious effect on protein structure/function --Has not been previously published as pathogenic or benign to our knowledge  Mr Panda's symptoms are inconsistent with the symptoms associated with ABCC9 gene-related condition. Plus, at this time, there is insufficient evidence to determine the role of this variant in disease.   As these results are not diagnostic, Mr Panda the and at-risk relatives should receive clinical evaluation and management based on medical and family history. It is important tfirst-degreehat patient's long-term management and surveillance is continued as recommended by his cardiologist. We also recommend that all of his first degree relatives undergo a clinical cardiac evaluation with history, physical exam, EKG and ECHO.    Predictive testing for variants of uncertain significance is not recommended for family members.   Targeted testing of family members for the variants in the ABCC9 gene may be considered to determine if the variants are segregating with the phenotype in this family. However, at this point there are no family members who would be informative to the segregation study.   The classification of variants may change over time as a result of new variant interpretations, guidelines,Health and /or new information. Genetic knowledge changes rapidly.  We encourage re-contacting the Cardiogenomics program at [facility] if there are significant changes in family or personal health.  PAH panel will be ordered today via [facility]  [facility] Pulmonary Arterial Hypertension Panel, [id number]). We will follow up once the results are in.  Patient expressed understanding of the presented information and satisfaction with having all of His questions and concerns addressed.   We will follow up once the results are in.

## 2024-10-21 NOTE — PLAN
[TextEntry] : 1.	Informed Consent obtained for the [facility] Pulmonary Arterial Hypertension Panel Test code: 89457 - Up to 14 genes  results of hypercoag workup  are negative (his brother has a hx of DVT PE): Prothrombin neg, Factor V leiden neg, Protein S neg, Protein C neg.  prior myloprolif worup neg  2.	Buccal swab done today to be sent to [facility] for analysis, pending insurance authorization.  3.	 Mr. ENRIQUE JULES  was provided an information about his genetic testing.  4.	A follow-up appointment was scheduled in1 months to discuss genetic testing results in person. Results generally return in 3-4 weeks.   -- re combined cardiac panel See above note for recommended management. A copy of genetic testing results and clinic note will be sent to  the referring cardiologist   or physician We encourage sharing these results with family members,    Contact the Cardiogenomics program at [facility] or the lab directly every few years to check on any changes in interpretation of a VUS, or if there are changes in the personal or family cardiac history.  Long-term management and surveillance for patient should be based on the patients clinical treatment as recommended by their cardiologist.   Any changes in cardiac surveillance should be discussed with the patients physician.  We remain available should there be any new information for personal or family history.  If there are any additional questions, please feel free to call the Cardiogenomics program at [facility], Mel Moore MS, CGC or Juanjo Olivo MD, PhD at [phone] Time spent counseling the patient during the visit was 45 min. I spent 45 minutes on the encounter   Patient seen with Mel Moore MS, CGC, board certified genetic counsellor        For any additional questions please call  Mel Moore MS, CGC or Juanjo Olivo MD, PhD at [phone] Time spent counseling the patient during the visit was 60 min. >50% time spent in care coordination /counseling for discussion of cardiac risk and appropriate management.   patient seen with Mel Moore MS, CGC for genetic counselling

## 2024-10-21 NOTE — HISTORY OF PRESENT ILLNESS
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 Carl), HFimpEF (EF initially 40%, LVEDD 3.8 cm now improved to 50%), polycythemia (? secondary; Hb 19), SUE (Dx 3/24; on CPAP), prior Covid (12/22) who presents for f/u of care.   Patient stated that he was diagnosed with COVID 12/22. Since that time he felt SOB and easy fatigue. His cardiac workup found the RV failure and subsequent LV reduced EF.  His family history is positive for his sister with hx with arrhythmia and positive tilt table test, s/p implantable device, brother with hx of pulmonary embolism (for last 10 years) and father with MI at 48. His family history is also positive for paternal aunt with SCD at age 60 and maternal uncle with hx of MI and stents placement at age 55.   The patient underwent genetic testing for a Combined cardiac panel and presents today for results. PAH panel via [facility] was discontinued, and the test was canceled; we will order the test today from [facility]3]

## 2024-10-21 NOTE — PHYSICAL EXAM
[Alert] : alert [Extraocular Movements Intact] : extraocular movements were intact [Normal] : without joint laxity or contractures [Pectus Deformity] : no pectus deformity [Scoliosis] : no scoliosis [Tremor] : no tremor [de-identified] : 5/5 SNOW and [place]

## 2024-10-21 NOTE — REASON FOR VISIT
[FreeTextEntry3] :  ENRIQUE JULES  is being seen for a follow-up consultation at the Cardiogenomics Program at [facility] on 07/08/2024. ENRIQUE JULES was referred by Dr Mitchell for hereditary cardiac predisposition risk assessment and counseling, due to concern for cardiomyopathy.

## 2024-10-21 NOTE — ASSESSMENT
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 Carl), HFimpEF (EF initially 40%, LVEDD 3.8 cm now improved to 50%), polycythemia (? secondary; Hb 19), SUE (Dx 3/24; on CPAP), prior Covid (12/22) .  Combined Cardiac Sequencing and Deletion/Duplication Panel- Negative for any pathogenic variants in any of the 138 genes tested. 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  As these results are not diagnostic, Mr Panda and his at-risk relatives should receive clinical evaluation and management based on medical and family history. It is important that patient's long-term management and surveillance is continued as recommended by his cardiologist. We also recommend that all of his first-degree relatives (parents, his son, and siblings) undergo a clinical cardiac evaluation, including a history, physical exam, EKG, and ECHO.    Predictive testing for variants of uncertain significance is not recommended for family members.   Targeted testing of family members for the variants in the ABCC9 generesult of new variant interpretations, guidelines, may be considered to determine if the variants are segregating with the phenotype in this family. However, at this point there are no family members who would be informative to the segregation study.   The classification of variants may change over time as a results of new variant interpretations guidelines and /or new information. Genetic knowledge changes rapidly.  We encourage re-contacting the Cardiogenomics program at [facility] if there are significant changes in family or personal health.  PAH panel will be ordered today via [employer] ([facility] Pulmonary Arterial Hypertension Panel, [id number]). We will follow up once the results are in.  Patient expressed understanding of the presented information and satisfaction with having all of His questions and concerns addressed.

## 2024-10-21 NOTE — PLAN
[TextEntry] : 1. It is important that patient's long-term management and surveillance is continued as recommended by her cardiologist.  His test results were sent to the patient and uploaded to his [facility] electronic medical chart. 2. [facility] Pulmonary Arterial Hypertension Panel, [id number] will be ordered today (buccal swab collected in the office today) 3. At this time, we recommend all of his first-degree relatives (parents, son and siblings) undergo a clinical cardiac evaluation with history, physical exam, EKG and ECHO. 4. Targeted testing for the VUS is not recommended at this time.  5. Follow-up with Cardiogenomics annually to check on the classification status of her VUS, or PRN if any new concerns arise.  For any additional questions please call Mel Moore MS, KENY, Cardiogenomic Program at [phone]  Mel Moore MS, CGC Assistant Chief, Pediatric Cardiogenomics, [facility] Director Genetics, Program for Cardiac Genetics, Genomics and Precision Medicine  in the Departments of Pediatrics and Cardiology, [facility] of Medicine, [facility]

## 2024-10-21 NOTE — FAMILY HISTORY
[FreeTextEntry2] : [place] [FreeTextEntry3] : [place]  [TextEntry] : His family history is positive for his sister with hx with arrhythmia and positive tilt table test, s/p implantable device, brother with hx of pulmonary embolism (for last 10 years) and father with MI at 48. His family history is also positive for paternal aunt with SCD at age 60 and maternal uncle with hx of MI and stents placement at age 55.

## 2024-10-25 ENCOUNTER — APPOINTMENT (OUTPATIENT)
Dept: PULMONOLOGY | Facility: CLINIC | Age: 49
End: 2024-10-25
Payer: COMMERCIAL

## 2024-10-25 VITALS
DIASTOLIC BLOOD PRESSURE: 74 MMHG | HEIGHT: 70.5 IN | TEMPERATURE: 97.7 F | WEIGHT: 186 LBS | HEART RATE: 61 BPM | RESPIRATION RATE: 15 BRPM | OXYGEN SATURATION: 95 % | SYSTOLIC BLOOD PRESSURE: 116 MMHG | BODY MASS INDEX: 26.33 KG/M2

## 2024-10-25 DIAGNOSIS — D69.3 IMMUNE THROMBOCYTOPENIC PURPURA: ICD-10-CM

## 2024-10-25 DIAGNOSIS — I27.20 PULMONARY HYPERTENSION, UNSPECIFIED: ICD-10-CM

## 2024-10-25 PROCEDURE — 99214 OFFICE O/P EST MOD 30 MIN: CPT

## 2024-10-25 PROCEDURE — 36415 COLL VENOUS BLD VENIPUNCTURE: CPT

## 2024-10-25 NOTE — DISCUSSION/SUMMARY
[FreeTextEntry1] : ---Assessment plan----------The patient has been referred here for further opinion regarding pulmonary problem, 47 yo referred for eval of PULMONARY HYPERTENSION,  H/O SPLENECTOMY FOR ITP-- PMH ITP, renal calculi, essential tremors 1) PULM HTN-----possibly realted to splenectomy--ON  SILDENAFIL--20 MG PO TID  ---right heart cath does show evidence of precapillary pulmonary hypertension. Also on  OPSUMIT 10mg daily- Keep euvolemic. On lasix as needed 2) -SUE on nightly cpap and benefits from use- compliance report indicates 97% compliance  3) advised no work at this time 4 )-DR KENDALL BALL HEART FAILURE patient's cardiologist has also started patient on metoprolol, farxiga  and Entresto----needs repeat echo 5) labs drawn in our office today 6) declines flu shot 7)  f/u in 3m  Thanks for allowing  me to participate  in the care of this patient.  Patient at this time  will follow  the above mentioned recommendations and return back for follow up visit. If you have any questions  I can be reached  at # [phone] (office).  Kareem Carl MD, [facility]  Pulmonary, Critical Care and Sleep Medicine

## 2024-10-25 NOTE — DISCUSSION/SUMMARY
[FreeTextEntry1] : ---Assessment plan----------The patient has been referred here for further opinion regarding pulmonary problem, 49 yo referred for eval of PULMONARY HYPERTENSION,  H/O SPLENECTOMY FOR ITP-- PMH ITP, renal calculi, essential tremors 1) PULM HTN-----possibly realted to splenectomy--ON  SILDENAFIL--20 MG PO TID  ---right heart cath does show evidence of precapillary pulmonary hypertension. Also on  OPSUMIT 10mg daily- Keep euvolemic. On lasix as needed 2) -SUE on nightly cpap and benefits from use- compliance report indicates 97% compliance  3) advised no work at this time 4 )-DR KENDALL BALL HEART FAILURE patient's cardiologist has also started patient on metoprolol, farxiga  and Entresto----needs repeat echo 5) labs drawn in our office today 6) declines flu shot 7)  f/u in 3m  Thanks for allowing  me to participate  in the care of this patient.  Patient at this time  will follow  the above mentioned recommendations and return back for follow up visit. If you have any questions  I can be reached  at # [phone] (office).  Kareem Carl MD, [facility]  Pulmonary, Critical Care and Sleep Medicine

## 2024-10-25 NOTE — HISTORY OF PRESENT ILLNESS
[Never] : never [Obstructive Sleep Apnea] : obstructive sleep apnea [TextBox_4] : This letter  is regarding your patient  who  attended pulmonary out patient office today.  I have reviewed  patient's  past history, social history, family history and medication list. I also  reviewed nurse practitioners/ and fellows  notes and assessment and agree with it.   The patient was referred by /[place] [id number] referred for newly diagnosed pulm htn PMH ITP in 1990's w/splenectomy, Essential tremors, renal stones w/lithotripsy Works as a  (helicopter) Lifelong nonsmoker  Post covid (12/2022) developed fatigue, DE LEON. Eval by PMD showed tachycardia which led to cardiology consult- echo showed elevated estimated pa pressures- Admitted to [facility] last month and underwent cardiac cath    ------No history of , fever, chills , rigors, chest pain, or hemoptysis. Questionable history of Raynaud's phenomenon. No h/o significant weight loss in last few months. No history of liver dysfunction , collagen vascular disorder or chronic thromboembolic disease. I would classify the patient's dyspnea as WHO  FUNCTIONAL CLASS II--------  ----Echo  date--2023----LV EF 40-45%, RA and RV dilatation moderate tricuspid regurgitation PASP 73 MMHG --CARDIAC MRI  [facility]-2023---moderately reduced LV systolic function with evidence of RV pressure volume overload moderately dilated right ventricle with reduced systolic function no evidence of intracardiac shunt ----Pft date---1/2024 normal study------ ----Ct scan date-[facility] 2023 -NO PE----- ----Cath date--[facility] 2023--RA 16, PCWP 12 - PA 79/42  RV 79/23 , CO 3.01L/MIN, CI 1.49L/MIN/M2   1/2024 accompanied by wife c/o fatigue, DE LEON, one recent episode of near syncope (but occured when he was recovering from flu)    4/1/2024 Pulm htn- remains on sildenafil 10mg tid doing well we will increase it follows heart failure team- on lasix as needed HST w/rhona- cpap study pending no pulm issues today accomapnied by wife to this visit  5/3/2024- Pulm htn- Sildenafil 20 mg TID- does not have any symptoms today  followed by Dr. Ervin- Furosemide 20 mg as needed.  Repeat cath 5/8/2024 HST- awaiting machine to be send home  accompanied with wife during the visit   5/2024 cardiac cath Moderate pre-capillary pulmonary hypertension (sPAP 58mmHg, dPAP 24mmHg, mPAP 35mmHg, PVR 4.11Wu) PCWP = 8mmHg with a V wave of 10mmHg  PAsat = 82.5% // AOsat = 99.9%   Mary [place] CO // CI = 6.57l/min // 3.25l/min/m2  SBP = 104/66/80   5/2024  TEB post cardiac cath to review results Pulm htn: on  Sildenafil 20 mg TID and diuretics, feels well rhona- awaits cpap delivery  10/2024  Pulm htn: on  Sildenafil 20 mg TID  and opsumit 10mg daily and diuretics (lasix) as needed, feels improved on dual PAH therapy RHONA on nightly cpap and benefits from use- compliance report indicates 97% compliant Needs repeat echo  [TextBox_100] : 3/2024 [TextBox_108] : 11.8 [TextBox_112] : 82.2

## 2024-10-25 NOTE — END OF VISIT
[FreeTextEntry3] :   I, Dr. Kareem Carl  personally performed the evaluation and management (E/M) services for this established patient who presents today with (a) new problem(s)/exacerbation of (an) existing condition(s). That E/M includes conducting the clinically appropriate interval history &/or exam, assessing all new/exacerbated conditions, and establishing a new plan of care. Today, my BIENVENIDO, Nayla Kennedy NP, , was here to observe my evaluation and management service for this new problem/exacerbated condition and follow the plan of care established by me going forward. [Time Spent: ___ minutes] : I have spent [unfilled] minutes of time on the encounter which excludes teaching and separately reported services.

## 2024-10-25 NOTE — HISTORY OF PRESENT ILLNESS
[Never] : never [Obstructive Sleep Apnea] : obstructive sleep apnea [TextBox_4] : This letter  is regarding your patient  who  attended pulmonary out patient office today.  I have reviewed  patient's  past history, social history, family history and medication list. I also  reviewed nurse practitioners/ and fellows  notes and assessment and agree with it.   The patient was referred by /[place] [id number] referred for newly diagnosed pulm htn PMH ITP in 1990's w/splenectomy, Essential tremors, renal stones w/lithotripsy Works as a  (helicopter) Lifelong nonsmoker  Post covid (12/2022) developed fatigue, DE LEON. Eval by PMD showed tachycardia which led to cardiology consult- echo showed elevated estimated pa pressures- Admitted to [facility] last month and underwent cardiac cath    ------No history of , fever, chills , rigors, chest pain, or hemoptysis. Questionable history of Raynaud's phenomenon. No h/o significant weight loss in last few months. No history of liver dysfunction , collagen vascular disorder or chronic thromboembolic disease. I would classify the patient's dyspnea as WHO  FUNCTIONAL CLASS II--------  ----Echo  date--2023----LV EF 40-45%, RA and RV dilatation moderate tricuspid regurgitation PASP 73 MMHG --CARDIAC MRI  [facility]-2023---moderately reduced LV systolic function with evidence of RV pressure volume overload moderately dilated right ventricle with reduced systolic function no evidence of intracardiac shunt ----Pft date---1/2024 normal study------ ----Ct scan date-[facility] 2023 -NO PE----- ----Cath date--[facility] 2023--RA 16, PCWP 12 - PA 79/42  RV 79/23 , CO 3.01L/MIN, CI 1.49L/MIN/M2   1/2024 accompanied by wife c/o fatigue, DE LEON, one recent episode of near syncope (but occured when he was recovering from flu)    4/1/2024 Pulm htn- remains on sildenafil 10mg tid doing well we will increase it follows heart failure team- on lasix as needed HST w/rhona- cpap study pending no pulm issues today accomapnied by wife to this visit  5/3/2024- Pulm htn- Sildenafil 20 mg TID- does not have any symptoms today  followed by Dr. Ervin- Furosemide 20 mg as needed.  Repeat cath 5/8/2024 HST- awaiting machine to be send home  accompanied with wife during the visit   5/2024 cardiac cath Moderate pre-capillary pulmonary hypertension (sPAP 58mmHg, dPAP 24mmHg, mPAP 35mmHg, PVR 4.11Wu) PCWP = 8mmHg with a V wave of 10mmHg  PAsat = 82.5% // AOsat = 99.9%   Mary [place] // CI = 6.57l/min // 3.25l/min/m2  SBP = 104/66/80   5/2024  TEB post cardiac cath to review results Pulm htn: on  Sildenafil 20 mg TID and diuretics, feels well rhona- awaits cpap delivery  10/2024  Pulm htn: on  Sildenafil 20 mg TID  and opsumit 10mg daily and diuretics (lasix) as needed, feels improved on dual PAH therapy RHONA on nightly cpap and benefits from use- compliance report indicates 97% compliant Needs repeat echo  [TextBox_100] : 3/2024 [TextBox_108] : 11.8 [TextBox_112] : 82.2

## 2024-10-26 ENCOUNTER — NON-APPOINTMENT (OUTPATIENT)
Age: 49
End: 2024-10-26

## 2024-10-28 LAB
ALBUMIN SERPL ELPH-MCNC: 4.6 G/DL
ALP BLD-CCNC: 54 U/L
ALT SERPL-CCNC: 28 U/L
ANION GAP SERPL CALC-SCNC: 12 MMOL/L
AST SERPL-CCNC: 22 U/L
BASOPHILS # BLD AUTO: 0.08 K/UL
BASOPHILS NFR BLD AUTO: 0.7 %
BILIRUB SERPL-MCNC: 0.5 MG/DL
BUN SERPL-MCNC: 14 MG/DL
CALCIUM SERPL-MCNC: 9.6 MG/DL
CHLORIDE SERPL-SCNC: 102 MMOL/L
CO2 SERPL-SCNC: 23 MMOL/L
CREAT SERPL-MCNC: 1 MG/DL
EGFR: 92 ML/MIN/1.73M2
EOSINOPHIL # BLD AUTO: 0.3 K/UL
EOSINOPHIL NFR BLD AUTO: 2.6 %
GLUCOSE SERPL-MCNC: 83 MG/DL
HCT VFR BLD CALC: 48 %
HGB BLD-MCNC: 15.8 G/DL
IMM GRANULOCYTES NFR BLD AUTO: 0.4 %
LYMPHOCYTES # BLD AUTO: 4.2 K/UL
LYMPHOCYTES NFR BLD AUTO: 36.1 %
MAN DIFF?: NORMAL
MCHC RBC-ENTMCNC: 29.5 PG
MCHC RBC-ENTMCNC: 32.9 GM/DL
MCV RBC AUTO: 89.7 FL
MONOCYTES # BLD AUTO: 1.05 K/UL
MONOCYTES NFR BLD AUTO: 9 %
NEUTROPHILS # BLD AUTO: 5.96 K/UL
NEUTROPHILS NFR BLD AUTO: 51.2 %
NT-PROBNP SERPL-MCNC: 72 PG/ML
PLATELET # BLD AUTO: 286 K/UL
POTASSIUM SERPL-SCNC: 4.4 MMOL/L
PROT SERPL-MCNC: 7.2 G/DL
RBC # BLD: 5.35 M/UL
RBC # FLD: 14.8 %
SODIUM SERPL-SCNC: 137 MMOL/L
TSH SERPL-ACNC: 2.52 UIU/ML
WBC # FLD AUTO: 11.64 K/UL

## 2024-11-28 ENCOUNTER — TRANSCRIPTION ENCOUNTER (OUTPATIENT)
Age: 49
End: 2024-11-28

## 2024-11-29 ENCOUNTER — TRANSCRIPTION ENCOUNTER (OUTPATIENT)
Age: 49
End: 2024-11-29

## 2025-01-15 ENCOUNTER — TRANSCRIPTION ENCOUNTER (OUTPATIENT)
Age: 50
End: 2025-01-15

## 2025-01-16 ENCOUNTER — RX RENEWAL (OUTPATIENT)
Age: 50
End: 2025-01-16

## 2025-01-22 ENCOUNTER — APPOINTMENT (OUTPATIENT)
Dept: CV DIAGNOSITCS | Facility: HOSPITAL | Age: 50
End: 2025-01-22

## 2025-01-22 ENCOUNTER — OUTPATIENT (OUTPATIENT)
Dept: OUTPATIENT SERVICES | Facility: HOSPITAL | Age: 50
LOS: 1 days | End: 2025-01-22
Payer: COMMERCIAL

## 2025-01-22 ENCOUNTER — RESULT REVIEW (OUTPATIENT)
Age: 50
End: 2025-01-22

## 2025-01-22 DIAGNOSIS — D69.3 IMMUNE THROMBOCYTOPENIC PURPURA: ICD-10-CM

## 2025-01-22 PROCEDURE — 93356 MYOCRD STRAIN IMG SPCKL TRCK: CPT

## 2025-01-22 PROCEDURE — 93306 TTE W/DOPPLER COMPLETE: CPT | Mod: 26

## 2025-02-07 ENCOUNTER — NON-APPOINTMENT (OUTPATIENT)
Age: 50
End: 2025-02-07

## 2025-02-07 ENCOUNTER — RX RENEWAL (OUTPATIENT)
Age: 50
End: 2025-02-07

## 2025-02-07 ENCOUNTER — APPOINTMENT (OUTPATIENT)
Dept: HEART FAILURE | Facility: CLINIC | Age: 50
End: 2025-02-07
Payer: COMMERCIAL

## 2025-02-07 VITALS
TEMPERATURE: 98.6 F | SYSTOLIC BLOOD PRESSURE: 92 MMHG | HEART RATE: 67 BPM | OXYGEN SATURATION: 97 % | BODY MASS INDEX: 26.9 KG/M2 | WEIGHT: 190 LBS | DIASTOLIC BLOOD PRESSURE: 58 MMHG | HEIGHT: 70.5 IN

## 2025-02-07 PROCEDURE — G2211 COMPLEX E/M VISIT ADD ON: CPT | Mod: NC

## 2025-02-07 PROCEDURE — 99214 OFFICE O/P EST MOD 30 MIN: CPT

## 2025-02-07 PROCEDURE — 93000 ELECTROCARDIOGRAM COMPLETE: CPT

## 2025-02-07 NOTE — ASSESSMENT
[FreeTextEntry1] : Mr. 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 Carl), HFrecEF (EF initially 40%, LVEDD 3.8 cm now improved to 50%), polycythemia (? secondary; Hb improved from 19 to 15), SUE (Dx 3/24; on CPAP), prior Covid (12/22) who presents for f/u of care. Of note, he has a childhood tremor. Currently appears euvolemic and well compensated with NYHA class I symptoms and normotensive. Unclear etiology of pulm HTN but possibility raised for group I and may be associated with prior splenectomy (however no evidence of thromboembolic disease).    1. HFrecEF/NICM - likely initial LV dysfunction was due to severe RV dysfunction and now LV function has normalized - c/w Entresto 49/51 twice/day for now; unclear if truly should continue but given recovery and tolerance will continue - c/w Farxiga 10 mg daily - c/w toprol 50 mg daily - c/w spironolactone 25 mg QD - c/w lasix prn  - repeat TTE in July with improved EF 62% and no TR jet to assess pulmonary pressures - counseled on disease process  - maintain log of weight/BP  - genetic testing done; VUS of ABCC9 gene (unlikely relevant); awaiting PH genetic testing results - CPET 7/24 reviewed and was encouraging  2. Pulm HTN - unclear etiology; ? 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 SUE, mild -con CPAP -Hgb improved 16   4. Work clearance - Defer to pulmonary - will investigate further potential effects  RTC 1 year or sooner prn

## 2025-02-07 NOTE — CARDIOLOGY SUMMARY
[de-identified] : 2/7/25 - NSR, nl axis 8/15/24 - NSR, nl axis, no evident RV strain  4/19/24 NSR 66, nl axis, TWI V1-V4, II/III/aVF, RV strain, no change 2/8/24 - NSR, nl axis, TWI V1-V4, II/III/aVF [de-identified] : 7/18/24 - CPET - 10:29 minutes; peak VO2 22.8 (70% predicted); Ve/VCO2 35; VO2/work slope 9.4 (nl) [de-identified] : 1/22/25 - nl EF, LVEDD 5 cm, mild RV dysfunction, no TR jet to assess pulmonary pressures, LVOT VTI 20 cm, IVC small and collapsed  7/9/24 - EF nl, mod RV dilatation/dysfunction, LVOT VTI 17 cm, no TR jet to assess pulmonary pressures, IVC 1.8 cm  3/19/24 - EF read as 42% (appears closer to 45-50%), LVEDD 4.6 cm, E/e' 6, no diastolic dysfunction, nl LA size, mod-severe RV dilatation/dysfunction, mild TR (approximate PASP 50), ? mid-systolic notching in PW of RVOT, D shape RV in systole c/w pressure overload, LVOT VTI 13 cm, IVC 1.9 cm 12/20/2023 - EF 40-45%, LVEDD 3.8 cm, septum 1.3 cm, PW 1.2 cm, E/e' 6, LVOT VTI 9.8 cm, RA and RV dilatation, mod TR, PASP 73 [de-identified] : 2023 - MRI ([facility]) - mod LV dysfunction (40-45%), LVEDD 3.8 cm, septum 1.3, PW 1.2 cm, nl LA size, mod-severe RV dilatation, mild RV dysfunction with Lopez's sign, no PFO, RV pressure/volume overload; no intracardiac shunt; E/e' 10, LVOT VTI 9.8 cm, mod TR, RVSP 73,   2023 - CT scan ([facility]) - no PE [de-identified] : 12/21/23 - [facility] - RA 16, RV 79/23, PA 79/42/55, PCWP 12, LVEDP 12, CO/CI 3/1.49, TPG 43, DPG 30, PVR 14; vasodilator testing done - Kimberly 40  w/o drop in PA pressures sat run - SVC 58%, IVC 62%, RV 58%, PA 64% (no significant step up); Qp/Qs 0.87 LHC - minor irregularities

## 2025-02-07 NOTE — HISTORY OF PRESENT ILLNESS
[FreeTextEntry1] : Mr. 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 Carl), HFrecEF (EF initially 40%, LVEDD 3.8 cm now improved to 50%), polycythemia (? secondary; Hb improved from 19 to 15), SUE (Dx 3/24; on CPAP), prior Covid (12/22) who presents for f/u of care. Of note, he has a childhood tremor. Referred by Dr. Carl.   For full initial details, please refer to note from 2/8/24.   Since last visit, reports doing well. Has been on dual therapy for PH (sildenafil, Opsumit) with improvement.   Reports home weight is 184 lbs (ranging 182-185). Hasn't needed lasix since spironolactone was started.   Continues to exercise on treadmill for 30-45 minutes 2-3 times a week and tolerating gradual increase in activity. Able to go up 2 flights of stairs without issues. Sleeping CPAP now with 1 pillow. Prior cough resolved.   Wants to return to work as  but discussed at length the potential risks and uncertainty how this would impact his pulmonary pressures as at higher altitude, air is less dense and oxygen levels are lower which theoretically could increase pulmonary pressures from relative hypoxia. Would like to investigate further.   Recently denies orthopnea/PND, SOB. Denies chest pain, palpitations, dizziness/LH, syncope.  Had genetic testing for PH sent; results pending.  Pulmonary HTN workup as below: 2/5/24 - V/Q scan low probability 1/29/24 - CT - no LAD; no PE; "unremarkable" 1/30/24 - serologies negative 1/29/24 - FEV1 4, FVC 5.2, FEV1/FVC 78%, DLCO 104% 3/4/24 - sleep study - mild SUE with mod O2 desaturation; rashid 82%

## 2025-02-07 NOTE — PHYSICAL EXAM
[Well Developed] : well developed [Well Nourished] : well nourished [No Acute Distress] : no acute distress [Normal Conjunctiva] : normal conjunctiva [No Carotid Bruit] : no carotid bruit [Normal S1, S2] : normal S1, S2 [No Murmur] : no murmur [No Rub] : no rub [No Gallop] : no gallop [Clear Lung Fields] : clear lung fields [Good Air Entry] : good air entry [No Respiratory Distress] : no respiratory distress  [Soft] : abdomen soft [Non Tender] : non-tender [No Masses/organomegaly] : no masses/organomegaly [Normal Bowel Sounds] : normal bowel sounds [Normal Gait] : normal gait [No Edema] : no edema [No Cyanosis] : no cyanosis [No Varicosities] : no varicosities [No Rash] : no rash [No Skin Lesions] : no skin lesions [Moves all extremities] : moves all extremities [No Focal Deficits] : no focal deficits [Normal Speech] : normal speech [Alert and Oriented] : alert and oriented [Normal memory] : normal memory [de-identified] : mild tremulousness (chronic) [de-identified] : RRR; no prominent P2; no m/r/g; no RV heave [de-identified] : JVP approx 6-8  [de-identified] : CTAB [de-identified] : no clubbing

## 2025-02-10 ENCOUNTER — APPOINTMENT (OUTPATIENT)
Dept: CARDIOLOGY | Facility: CLINIC | Age: 50
End: 2025-02-10
Payer: COMMERCIAL

## 2025-02-10 VITALS — DIASTOLIC BLOOD PRESSURE: 68 MMHG | OXYGEN SATURATION: 95 % | SYSTOLIC BLOOD PRESSURE: 109 MMHG | HEART RATE: 58 BPM

## 2025-02-10 PROCEDURE — 99215 OFFICE O/P EST HI 40 MIN: CPT | Mod: 25

## 2025-02-10 PROCEDURE — 93000 ELECTROCARDIOGRAM COMPLETE: CPT

## 2025-02-10 NOTE — HISTORY OF PRESENT ILLNESS
[FreeTextEntry1] : ENRIQUE JULES is a 47 yo F PMH NICM, EF 40%, pulm HTN, RV dilatation , primary pulmonary HTN  states diagnosed 12/23  in 12/22 had covid then easy fatigue and underwent a workup that found the RV failure and subsequent LV reduced EF.  he underwent genetic testing and today presents for results

## 2025-02-10 NOTE — PHYSICAL EXAM
[Alert] : alert [Extraocular Movements Intact] : extraocular movements were intact [Normal] : without joint laxity or contractures [Pectus Deformity] : no pectus deformity [Scoliosis] : no scoliosis [Tremor] : no tremor [de-identified] : 5/5 SNOW and [place]

## 2025-02-10 NOTE — DISCUSSION/SUMMARY
[TextEntry] : Sequence analysis and deletion/duplication testing of the 14 genes listed in the Genes Analyzed section. [facility] Pulmonary Arterial Hypertension Panel Add-on Preliminary-evidence Genes for Pulmonary Arterial Hypertension  ENG c.698C>T (p.Ffl911Wio) heterozygous Uncertain Significance    No known pathogenic variants were identified in any of the 14 genes evaluated  a variant of uncertain significance (VUS)  was identified in the ENG gene that is of low clinical suspicion.   the limitations of genetic testing were discussed with ENRIQUE CHING  A Variant of Uncertain Significance, c.698C>T (p.Bcd498Hnq), was identified in ENG. The ENG gene is associated with autosomal dominant hereditary hemorrhagic telangiectasia (HHT) ([facility] UID: [id number]). ENG, Exon 6, c.698C>T (p.Sfh160Nxu), heterozygous, Uncertain Significance This sequence change replaces threonine, which is neutral and polar, with methionine, which is neutral and non-polar, at codon 233 of the ENG protein (p.Ssn915Crh). This variant is present in population databases ([id number], gnomAD 0.005%). This variant has not been reported in the literature in individuals affected with ENG-related conditions. ClinVar contains an entry for this variant (Variation ID: [id number]). [employer] Evidence Modeling of protein sequence and biophysical properties (such as structural, functional, and spatial information, amino acid conservation, physicochemical variation, residue mobility, and thermodynamic stability) has been performed for this missense variant. However, the output from this modeling did not meet the statistical confidence thresholds required to predict the impact of this variant on ENG protein function. In summary, the available evidence is currently insufficient to determine the role of this variant in disease. Therefore, it has been classified as a Variant of Uncertain Significance. - Genes evaluated in this panel  GENE TRANSCRIPT ACVRL1 NM_000020.2 AQP1 NM_198098.3 QFT78M1 NM_024524.3 BMPR1B NM_001203.2 BMPR2 NM_001204.6 CAV1 NM_001753.4 ESM2NQ9 NM_001013703.3 ENG* NM_000118.3 GDF2 NM_016204.2 KCNA5 NM_002234.3 KCNK3 NM_002246.2 SMAD9 NM_001127217.2 SOX17 NM_022454.3 TBX4 NM_018488.3 --  Combined Cardiac Sequencing and Deletion/Duplication Panel ABCC9 Autosomal dominant c.2359 G>A p.(A787T) Heterozygous Variant of Uncertain Significance    No known pathogenic variants were identified in any of the 138 genes evaluated  a variant of uncertain significance (VUS)  was identified in the ABCC9  gene that is of low clinical suspicion  the limitations of genetic testing were discussed with ENRIQUE JULES    for him continue medical care as per his cardiology team  for his family At this time we recommend all of his  first degree relatives undergo a clinical cardiac evaluation with history, physical exam, EKG and ECHO. If their initial clinical evaluation is normal they should continued to have clinical cardiac evaluation with imaging every 3-5 years (for adults).     ABCC9  The ABCC9 gene, also known as SUR2, encodes a protein that is part of an ATP-sensitive potassium channel complex (PMID: 00482382). ABCC9 encodes two isoforms, SUR2A and SUR2B, with the SUR2A isoform expressed in cardiac and skeletal muscle and the SUR2B isoform expressed in vascular smooth muscle and hair follicles (PMID: 59987638). Pathogenic variants in both isoforms of the ABCC9 gene cause autosomal dominant hypertrichotic osteochondrodysplasia, also known as Cantu syndrome, or the related disorders acromegaloid facial appearance (AFA) and hypertrichosis with acromegaloid facial features (HAFF) (PMID: 41356720). These conditions belong to a spectrum with variable clinical expressivity, characterized by congenital hypertrichosis, coarse facial appearance with thickened lips and upper eyelids, cardiovascular abnormalities including an enlarged heart, patent ductus arteriosus, pericardial effusion, or increased vascular tortuosity, and osteochondrodysplasia (PMID: 51940090). Intellect is generally normal, though individuals with learning disabilities or intellectual disability have been reported (PMID: 13129607). Pathogenic variants affecting only isoform SUR2A have been reported in association with isolated cardiomyopathy (PMID: 00004181). To date, penetrance is reported to be complete, although evaluation of additional families is necessary to confirm this finding (PMID: 97091087). Additionally, homozygous loss of function variants have been reported in individuals with a neurodevelopmental disorder with features including developmental delay, mild to severe intellectual disability, spasticity, and hypotonia. Other reported clinical symptoms include microcephaly, seizures, myopathy, dysmorphic features, and white matter abnormalities on brain MRI (PMID: 38299754, 52471738). Further evidence is needed to establish the association of variants in the ABCC9 gene with an autosomal recessive neurodevelopmental disorder.  p.(Ezl238Gny) (GCC>ACC): c.2359 G>A in exon 19 of the ABCC9 gene (NM_020297.2) Not observed at significant frequency in large population cohorts (gnomAD) In silico analysis supports that this missense variant has a deleterious effect on protein structure/function Has not been previously published as pathogenic or benign to our knowledge We interpret this as a Variant of Uncertain Significance. - [facility] Pulmonary Arterial Hypertension Panel Test code: 39907 - Up to 14 genes  We reviewed the risks, benefits, limitations, and implications of genetic testing.  Additionally, we examined the patients motivation for testing, and the emotional ramifications of the test results.  The patient is aware that results may impact family members.  Counseling resources are available if requested.   [place], the Genetic Information Non-discrimination Act protects most people from discrimination in health insurance and employment at firms with over 50 employees.  [place]OTT does not protect against use of genetic information by life insurance, disability, or long-term care insurers.  Further information on other limitations is available at [email or web address].   After a review of testing options, the patient elected to the [facility] Pulmonary Arterial Hypertension Panel panel offered through [employer]. . Panels contain 14  genes associated with varying levels of risk for pulmonary HTN. We reviewed the three possible results for each gene on this panel: positive, negative and variant of uncertain significance (VUS).  We discussed that panel testing could result in incidental findings, such as identifying a positive result in a gene with cardiac risks not seen in the current personal or discussed family history. If results are positive, we would discuss the  risks and management options associated with that cardiac condition susceptibility gene and discuss genetic testing for family members.  Pedigree was reviewed with the patient to identify those who should be tested if the patient is positive.  If results are negative or a VUS is identified, the patient would continue to be managed based on personal and family history of their  cardiac condition.

## 2025-02-10 NOTE — FAMILY HISTORY
[FreeTextEntry1] : FamilyHistory_20_twCiteListControlStart FamilyHistory_20_twCiteListControlEnd [id number]  A four-generation family history was constructed and scanned into [employer].  Family history is significant for:  siblings  brother hx PE  sister hx syncope, + tilt table PTS  Mother Maternal aunts Maternal uncles Maternal Grandmother Maternal Grandfather  Father Paternal aunts Paternal uncles Paternal Grandfather Paternal Grandmother  children:  his maternal families originate from [place] and paternal families originate from indicate country or region.  No Ashkenazi [place] ancestry.  Family history was positive/ negative for consanguinity   No family history of SIDS

## 2025-02-10 NOTE — SIGNATURES
[TextEntry] : Juanjo Olivo MD, PhD  Medical Director Program for Cardiac Genetics, Genomics and Precision Medicine Department of Cardiology [facility]  [employer] at [address] [place] [address] Tel: [phone] Fax: [phone]  ([facility] office) [address], 3rd floor (between [address]) [place] [address] (p) [phone] (f) [phone]

## 2025-02-10 NOTE — PLAN
[TextEntry] :  See above note for recommended management. A copy of genetic testing results and clinic note will be sent to  the referring cardiologist   or physician We encourage sharing these results with family members,    Contact the Cardiogenomics program at [facility] or the lab directly every few years to check on any changes in interpretation of a VUS, or if there are changes in the personal or family cardiac history.  Long-term management and surveillance for patient should be based on the patients clinical treatment as recommended by their cardiologist.   Any changes in cardiac surveillance should be discussed with the patients physician.  We remain available should there be any new information for personal or family history.  If there are any additional questions, please feel free to call the Cardiogenomics program at [facility], Mel Moore MS, KENY or Juanjo Olivo MD, PhD at [phone] Time spent counseling the patient during the visit was 45 min. I spent 55 minutes on the encounter   case discussed with Mel Moore MS, CGC, board certified genetic counsellor

## 2025-02-10 NOTE — ASSESSMENT
[TextEntry] : ENRIQUE JULES  is a 48 year M  with a history of NICM, EF 40%, pulm HTN, RV dilatation , primary pulmonary HTN He underwent genetic testing for NNICM, results identified a variant of uncertain significance (VUS) in ABCC9 gene that is of low clinical suspicion.  He also underwent genetic testing for pulmonary HTN that identified a variant of uncertain significance (VUS) in ENG that is of low clinical suspicion.  the limitations of genetic testing were discussed with ENRIQUE JULES   for him continue medical care as per his cardiology team  for his family At this time we recommend all of his first degree relatives undergo a clinical cardiac evaluation with history, physical exam, EKG and ECHO. If their initial clinical evaluation is normal they should continued to have clinical cardiac evaluation with imaging every 3-5 years (for adults).    Genetic knowledge changes rapidly.  We encourage re-contacting the cardiogenomics program at [facility] if there are significant changes in family or personal health, and annually. ENRIQUE JULES expressed understanding of the presented information and satisfaction with having his questions and concerns addressed.   EKG performed today for the above diagnosis.

## 2025-02-10 NOTE — REASON FOR VISIT
[FreeTextEntry3] : Dear Dr. Ervin, Dr. Carl        . I saw your patient ENRIQUE JULES on 2/10/25 . Please see the note below for the assessment and plan.   ENRIQUE JULES  was seen  for an initial consultation at the Cardiogenomics Program at [facility] on 07/08/2024.   Mr. JULES was referred by  Dr. Ervin for hereditary cardiac predisposition risk assessment and counseling, due to( hx NICM and pulm HTN

## 2025-02-19 ENCOUNTER — APPOINTMENT (OUTPATIENT)
Dept: PULMONOLOGY | Facility: CLINIC | Age: 50
End: 2025-02-19
Payer: COMMERCIAL

## 2025-02-19 ENCOUNTER — NON-APPOINTMENT (OUTPATIENT)
Age: 50
End: 2025-02-19

## 2025-02-19 PROCEDURE — 99214 OFFICE O/P EST MOD 30 MIN: CPT | Mod: 95

## 2025-02-20 NOTE — HISTORY OF PRESENT ILLNESS
[Never] : never [Obstructive Sleep Apnea] : obstructive sleep apnea [TextBox_4] : This letter  is regarding your patient  who  attended pulmonary out patient office today.  I have reviewed  patient's  past history, social history, family history and medication list. I also  reviewed nurse practitioners/ and fellows  notes and assessment and agree with it.   The patient was referred by /[place] [age]yo referred for newly diagnosed pulm htn PMH ITP in 1990's w/splenectomy, Essential tremors, renal stones w/lithotripsy Works as a  (helicopter) Lifelong nonsmoker  Post covid (12/2022) developed fatigue, DE LEON. Eval by PMD showed tachycardia which led to cardiology consult- echo showed elevated estimated pa pressures- Admitted to [facility] last month and underwent cardiac cath    ------No history of , fever, chills , rigors, chest pain, or hemoptysis. Questionable history of Raynaud's phenomenon. No h/o significant weight loss in last few months. No history of liver dysfunction , collagen vascular disorder or chronic thromboembolic disease. I would classify the patient's dyspnea as WHO  FUNCTIONAL CLASS II--------  ----Echo  date--2023----LV EF 40-45%, RA and RV dilatation moderate tricuspid regurgitation PASP 73 [facility] --CARDIAC MRI  [facility]-2023---moderately reduced LV systolic function with evidence of RV pressure volume overload moderately dilated right ventricle with reduced systolic function no evidence of intracardiac shunt ----Pft date---1/2024 normal study------ ----Ct scan date-[facility] 2023 -NO PE----- ----Cath date--[facility] 2023--RA 16, PCWP 12 - PA 79/42  RV 79/23 , CO 3.01L/MIN, CI 1.49L/MIN/M2   1/2024 accompanied by wife c/o fatigue, DE LEON, one recent episode of near syncope (but occured when he was recovering from flu)    4/1/2024 Pulm htn- remains on sildenafil 10mg tid doing well we will increase it follows heart failure team- on lasix as needed HST w/rhona- cpap study pending no pulm issues today accomapnied by wife to this visit  5/3/2024- Pulm htn- Sildenafil 20 mg TID- does not have any symptoms today  followed by Dr. Ervin- Furosemide 20 mg as needed.  Repeat cath 5/8/2024 HST- awaiting machine to be send home  accompanied with wife during the visit   5/2024 cardiac cath Moderate pre-capillary pulmonary hypertension (sPAP 58mmHg, dPAP 24mmHg, mPAP 35mmHg, PVR 4.11Wu) PCWP = 8mmHg with a V wave of 10mmHg  PAsat = 82.5% // AOsat = 99.9%   [place] // CI = 6.57l/min // 3.25l/min/m2  SBP = 104/66/80   5/2024  TEB post cardiac cath to review results Pulm htn: on  Sildenafil 20 mg TID and diuretics, feels well rhona- awaits cpap delivery  10/2024  Pulm htn: on  Sildenafil 20 mg TID  and opsumit 10mg daily and diuretics (lasix) as needed, feels improved on dual PAH therapy RHONA on nightly cpap and benefits from use- compliance report indicates 97% compliant Needs repeat echo  feb 2025--doing better  ---Pulm htn: on  Sildenafil 20 mg TID  and opsumit 10mg daily and diuretics (lasix) as needed,  RHONA on nightly cpap and benefits from use- compliance report indicates 97% compliant Needs repeat echo [TextBox_100] : 3/2024 [TextBox_108] : 11.8 [TextBox_112] : 82.2

## 2025-02-20 NOTE — REASON FOR VISIT
[Follow-Up] : a follow-up visit [Pulmonary Hypertension] : pulmonary hypertension [Home] : at home, [unfilled] , at the time of the visit. [Medical Office: ([facility])___] : at the medical office located in  [Telehealth (audio & video)] : This visit was provided via telehealth using real-time 2-way audio visual technology. [Verbal consent obtained from patient] : the patient, [unfilled]

## 2025-02-20 NOTE — DISCUSSION/SUMMARY
[FreeTextEntry1] : ---Assessment plan----------The patient has been referred here for further opinion regarding pulmonary problem, 48 yo referred for eval of PULMONARY HYPERTENSION,  H/O SPLENECTOMY FOR ITP-- PMH ITP, renal calculi, essential tremors 1) PULM HTN-----possibly realted to splenectomy--ON  SILDENAFIL--20 MG PO TID  ---right heart cath does show evidence of precapillary pulmonary hypertension. Also on  OPSUMIT 10mg daily- Keep euvolemic. 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  Patient at this time  will follow  the above mentioned recommendations and return back for follow up visit. If you have any questions  I can be reached  at # [phone] (office).  Kareem Carl MD, [facility]  Pulmonary, Critical Care and Sleep Medicine

## 2025-02-20 NOTE — REASON FOR VISIT
[Follow-Up] : a follow-up visit [Pulmonary Hypertension] : pulmonary hypertension [Home] : at home, [unfilled] , at the time of the visit. [Medical Office: ([facility])___] : at the medical office located in  [Telehealth (audio & video)] : This visit was provided via telehealth using real-time 2-way audio visual technology. [Verbal consent obtained from patient] : the patient, [unfilled]

## 2025-02-20 NOTE — DISCUSSION/SUMMARY
[FreeTextEntry1] : ---Assessment plan----------The patient has been referred here for further opinion regarding pulmonary problem, 48 yo referred for eval of PULMONARY HYPERTENSION,  H/O SPLENECTOMY FOR ITP-- PMH ITP, renal calculi, essential tremors 1) PULM HTN-----possibly realted to splenectomy--ON  SILDENAFIL--20 MG PO TID  ---right heart cath does show evidence of precapillary pulmonary hypertension. Also on  OPSUMIT 10mg daily- Keep euvolemic. 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  Patient at this time  will follow  the above mentioned recommendations and return back for follow up visit. If you have any questions  I can be reached  at # [phone] (office).  Kareem Carl MD, [facility]  Pulmonary, Critical Care and Sleep Medicine

## 2025-02-20 NOTE — HISTORY OF PRESENT ILLNESS
[Never] : never [Obstructive Sleep Apnea] : obstructive sleep apnea [TextBox_4] : This letter  is regarding your patient  who  attended pulmonary out patient office today.  I have reviewed  patient's  past history, social history, family history and medication list. I also  reviewed nurse practitioners/ and fellows  notes and assessment and agree with it.   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 Eval by PMD showed tachycardia which led to cardiology consult- echo showed elevated estimated pa pressures- Admitted to [facility] last month and underwent cardiac cath    ------No history of , fever, chills , rigors, chest pain, or hemoptysis. Questionable history of Raynaud's phenomenon. No h/o significant weight loss in last few months. No history of liver dysfunction , collagen vascular disorder or chronic thromboembolic disease. I would classify the patient's dyspnea as WHO  FUNCTIONAL CLASS II--------  ----Echo  date--2023----LV EF 40-45%, RA and RV dilatation moderate tricuspid regurgitation PASP 73 MMHG --CARDIAC MRI  [facility]-2023---moderately reduced LV systolic function with evidence of RV pressure volume overload moderately dilated right ventricle with reduced systolic function no evidence of intracardiac shunt ----Pft date---1/2024 normal study------ ----Ct scan date-[facility] 2023 -NO PE----- ----Cath date--[facility] 2023--RA 16, PCWP 12 - PA 79/42  RV 79/23 , CO 3.01L/MIN, CI 1.49L/MIN/M2   1/2024 accompanied by wife c/o fatigue, DE LEON, one recent episode of near syncope (but occured when he was recovering from flu)    4/1/2024 Pulm htn- remains on sildenafil 10mg tid doing well we will increase it follows heart failure team- on lasix as needed HST w/rhona- cpap study pending no pulm issues today accomapnied by wife to this visit  5/3/2024- Pulm htn- Sildenafil 20 mg TID- does not have any symptoms today  followed by Dr. Ervin- Furosemide 20 mg as needed.  Repeat cath 5/8/2024 HST- awaiting machine to be send home  accompanied with wife during the visit   5/2024 cardiac cath Moderate pre-capillary pulmonary hypertension (sPAP 58mmHg, dPAP 24mmHg, mPAP 35mmHg, PVR 4.11Wu) PCWP = 8mmHg with a V wave of 10mmHg  PAsat = 82.5% // AOsat = 99.9%   Mary Ann CO // CI = 6.57l/min // 3.25l/min/m2  SBP = 104/66/80   5/2024  TEB post cardiac cath to review results Pulm htn: on  Sildenafil 20 mg TID and diuretics, feels well rhona- awaits cpap delivery  10/2024  Pulm htn: on  Sildenafil 20 mg TID  and opsumit 10mg daily and diuretics (lasix) as needed, feels improved on dual PAH therapy RHONA on nightly cpap and benefits from use- compliance report indicates 97% compliant Needs repeat echo  feb 2025--doing better  ---Pulm htn: on  Sildenafil 20 mg TID  and opsumit 10mg daily and diuretics (lasix) as needed,  RHONA on nightly cpap and benefits from use- compliance report indicates 97% compliant Needs repeat echo [TextBox_100] : 3/2024 [TextBox_108] : 11.8 [TextBox_112] : 82.2

## 2025-03-04 ENCOUNTER — APPOINTMENT (OUTPATIENT)
Dept: PULMONOLOGY | Facility: CLINIC | Age: 50
End: 2025-03-04
Payer: COMMERCIAL

## 2025-03-04 VITALS
OXYGEN SATURATION: 95 % | WEIGHT: 190 LBS | TEMPERATURE: 98.4 F | BODY MASS INDEX: 27.2 KG/M2 | SYSTOLIC BLOOD PRESSURE: 101 MMHG | DIASTOLIC BLOOD PRESSURE: 67 MMHG | HEIGHT: 70 IN | HEART RATE: 65 BPM

## 2025-03-04 PROCEDURE — ZZZZZ: CPT

## 2025-03-04 PROCEDURE — 99214 OFFICE O/P EST MOD 30 MIN: CPT | Mod: 25

## 2025-03-04 PROCEDURE — 94618 PULMONARY STRESS TESTING: CPT

## 2025-03-04 NOTE — HISTORY OF PRESENT ILLNESS
[Never] : never [Obstructive Sleep Apnea] : obstructive sleep apnea [TextBox_4] : This letter  is regarding your patient  who  attended pulmonary out patient office today.  I have reviewed  patient's  past history, social history, family history and medication list. I also  reviewed nurse practitioners/ and fellows  notes and assessment and agree with it.   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 Eval by PMD showed tachycardia which led to cardiology consult- echo showed elevated estimated pa pressures- Admitted to [facility] last month and underwent cardiac cath    ------No history of , fever, chills , rigors, chest pain, or hemoptysis. Questionable history of Raynaud's phenomenon. No h/o significant weight loss in last few months. No history of liver dysfunction , collagen vascular disorder or chronic thromboembolic disease. I would classify the patient's dyspnea as WHO  FUNCTIONAL CLASS II--------  ----Echo  date--2023----LV EF 40-45%, RA and RV dilatation moderate tricuspid regurgitation PASP 73 MMHG --CARDIAC MRI  [facility]-2023---moderately reduced LV systolic function with evidence of RV pressure volume overload moderately dilated right ventricle with reduced systolic function no evidence of intracardiac shunt ----Pft date---1/2024 normal study------ ----Ct scan date-[facility] 2023 -NO PE----- ----Cath date--[facility] 2023--RA 16, PCWP 12 - PA 79/42  RV 79/23 , CO 3.01L/MIN, CI 1.49L/MIN/M2   1/2024 accompanied by wife c/o fatigue, DE LEON, one recent episode of near syncope (but occured when he was recovering from flu)    4/1/2024 Pulm htn- remains on sildenafil 10mg tid doing well we will increase it follows heart failure team- on lasix as needed HST w/rhona- cpap study pending no pulm issues today accomapnied by wife to this visit  5/3/2024- Pulm htn- Sildenafil 20 mg TID- does not have any symptoms today  followed by Dr. Ervin- Furosemide 20 mg as needed.  Repeat cath 5/8/2024 HST- awaiting machine to be send home  accompanied with wife during the visit   5/2024 cardiac cath Moderate pre-capillary pulmonary hypertension (sPAP 58mmHg, dPAP 24mmHg, mPAP 35mmHg, PVR 4.11Wu) PCWP = 8mmHg with a V wave of 10mmHg  PAsat = 82.5% // AOsat = 99.9%   Mary Ann CO // CI = 6.57l/min // 3.25l/min/m2  SBP = 104/66/80   5/2024  TEB post cardiac cath to review results Pulm htn: on  Sildenafil 20 mg TID and diuretics, feels well rhona- awaits cpap delivery  10/2024  Pulm htn: on  Sildenafil 20 mg TID  and opsumit 10mg daily and diuretics (lasix) as needed, feels improved on dual PAH therapy RHONA on nightly cpap and benefits from use- compliance report indicates 97% compliant Needs repeat echo  feb 2025--doing better  ---Pulm htn: on  Sildenafil 20 mg TID  and opsumit 10mg daily and diuretics (lasix) as needed,  RHONA on nightly cpap and benefits from use- compliance report indicates 97% compliant Needs repeat echo [TextBox_100] : 3/2024 [TextBox_108] : 11.8 [TextBox_112] : 82.2

## 2025-03-04 NOTE — DISCUSSION/SUMMARY
[FreeTextEntry1] : ---Assessment plan----------The patient has been referred here for further opinion regarding pulmonary problem, 48 yo referred for eval of PULMONARY HYPERTENSION,  H/O SPLENECTOMY FOR ITP-- PMH ITP, renal calculi, essential tremors 1) PULM HTN-----possibly realted to splenectomy--ON  SILDENAFIL--20 MG PO TID  ---right heart cath does show evidence of precapillary pulmonary hypertension. Also on  OPSUMIT 10mg daily- Keep euvolemic. 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  Patient at this time  will follow  the above mentioned recommendations and return back for follow up visit. If you have any questions  I can be reached  at # [phone] (office).  Kareem Carl MD, [facility]  Pulmonary, Critical Care and Sleep Medicine

## 2025-03-04 NOTE — DISCUSSION/SUMMARY
[FreeTextEntry1] : ---Assessment plan----------The patient has been referred here for further opinion regarding pulmonary problem, 50 yo referred for eval of PULMONARY HYPERTENSION,  H/O SPLENECTOMY FOR ITP-- PMH ITP, renal calculi, essential tremors 1) PULM HTN-----possibly realted to splenectomy--ON  SILDENAFIL--20 MG PO TID  ---right heart cath does show evidence of precapillary pulmonary hypertension. Also on  OPSUMIT 10mg daily- Keep euvolemic. 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  Patient at this time  will follow  the above mentioned recommendations and return back for follow up visit. If you have any questions  I can be reached  at # [phone] (office).  Kareem Carl MD, [facility]  Pulmonary, Critical Care and Sleep Medicine

## 2025-03-04 NOTE — HISTORY OF PRESENT ILLNESS
[Never] : never [Obstructive Sleep Apnea] : obstructive sleep apnea [TextBox_4] : This letter  is regarding your patient  who  attended pulmonary out patient office today.  I have reviewed  patient's  past history, social history, family history and medication list. I also  reviewed nurse practitioners/ and fellows  notes and assessment and agree with it.   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 Eval by PMD showed tachycardia which led to cardiology consult- echo showed elevated estimated pa pressures- Admitted to [facility] last month and underwent cardiac cath    ------No history of , fever, chills , rigors, chest pain, or hemoptysis. Questionable history of Raynaud's phenomenon. No h/o significant weight loss in last few months. No history of liver dysfunction , collagen vascular disorder or chronic thromboembolic disease. I would classify the patient's dyspnea as WHO  FUNCTIONAL CLASS II--------  ----Echo  date--2023----LV EF 40-45%, RA and RV dilatation moderate tricuspid regurgitation PASP 73 MMHG --CARDIAC MRI  [facility]-2023---moderately reduced LV systolic function with evidence of RV pressure volume overload moderately dilated right ventricle with reduced systolic function no evidence of intracardiac shunt ----Pft date---1/2024 normal study------ ----Ct scan date-[facility] 2023 -NO PE----- ----Cath date--[facility] 2023--RA 16, PCWP 12 - PA 79/42  RV 79/23 , CO 3.01L/MIN, CI 1.49L/MIN/M2   1/2024 accompanied by wife c/o fatigue, DE LEON, one recent episode of near syncope (but occured when he was recovering from flu)    4/1/2024 Pulm htn- remains on sildenafil 10mg tid doing well we will increase it follows heart failure team- on lasix as needed HST w/rhona- cpap study pending no pulm issues today accomapnied by wife to this visit  5/3/2024- Pulm htn- Sildenafil 20 mg TID- does not have any symptoms today  followed by Dr. Ervin- Furosemide 20 mg as needed.  Repeat cath 5/8/2024 HST- awaiting machine to be send home  accompanied with wife during the visit   5/2024 cardiac cath Moderate pre-capillary pulmonary hypertension (sPAP 58mmHg, dPAP 24mmHg, mPAP 35mmHg, PVR 4.11Wu) PCWP = 8mmHg with a V wave of 10mmHg  PAsat = 82.5% // AOsat = 99.9%   Mary Ann CO // CI = 6.57l/min // 3.25l/min/m2  SBP = 104/66/80   5/2024  TEB post cardiac cath to review results Pulm htn: on  Sildenafil 20 mg TID and diuretics, feels well rhona- awaits cpap delivery  10/2024  Pulm htn: on  Sildenafil 20 mg TID  and opsumit 10mg daily and diuretics (lasix) as needed, feels improved on dual PAH therapy RHONA on nightly cpap and benefits from use- compliance report indicates 97% compliant Needs repeat echo  feb 2025--doing better  ---Pulm htn: on  Sildenafil 20 mg TID  and opsumit 10mg daily and diuretics (lasix) as needed,  RHONA on nightly cpap and benefits from use- compliance report indicates 97% compliant Needs repeat echo [TextBox_100] : 3/2024 [TextBox_108] : 11.8 [TextBox_112] : 82.2

## 2025-03-05 ENCOUNTER — RX RENEWAL (OUTPATIENT)
Age: 50
End: 2025-03-05

## 2025-04-07 ENCOUNTER — RX RENEWAL (OUTPATIENT)
Age: 50
End: 2025-04-07

## 2025-05-12 ENCOUNTER — RX RENEWAL (OUTPATIENT)
Age: 50
End: 2025-05-12

## 2025-06-11 ENCOUNTER — RX RENEWAL (OUTPATIENT)
Age: 50
End: 2025-06-11

## 2025-08-13 ENCOUNTER — RX RENEWAL (OUTPATIENT)
Age: 50
End: 2025-08-13

## 2025-09-02 ENCOUNTER — APPOINTMENT (OUTPATIENT)
Dept: PULMONOLOGY | Facility: CLINIC | Age: 50
End: 2025-09-02
Payer: COMMERCIAL

## 2025-09-02 VITALS
SYSTOLIC BLOOD PRESSURE: 117 MMHG | RESPIRATION RATE: 16 BRPM | WEIGHT: 192 LBS | HEART RATE: 62 BPM | BODY MASS INDEX: 27.49 KG/M2 | DIASTOLIC BLOOD PRESSURE: 72 MMHG | HEIGHT: 70 IN | OXYGEN SATURATION: 96 %

## 2025-09-02 DIAGNOSIS — D69.3 IMMUNE THROMBOCYTOPENIC PURPURA: ICD-10-CM

## 2025-09-02 DIAGNOSIS — E03.9 HYPOTHYROIDISM, UNSPECIFIED: ICD-10-CM

## 2025-09-02 DIAGNOSIS — I27.20 PULMONARY HYPERTENSION, UNSPECIFIED: ICD-10-CM

## 2025-09-02 DIAGNOSIS — G47.33 OBSTRUCTIVE SLEEP APNEA (ADULT) (PEDIATRIC): ICD-10-CM

## 2025-09-02 PROCEDURE — 99214 OFFICE O/P EST MOD 30 MIN: CPT

## 2025-09-02 PROCEDURE — 36415 COLL VENOUS BLD VENIPUNCTURE: CPT

## 2025-09-06 ENCOUNTER — RX RENEWAL (OUTPATIENT)
Age: 50
End: 2025-09-06

## 2025-09-07 LAB
ALBUMIN SERPL ELPH-MCNC: 4.9 G/DL
ALP BLD-CCNC: 52 U/L
ALT SERPL-CCNC: 37 U/L
ANION GAP SERPL CALC-SCNC: 14 MMOL/L
AST SERPL-CCNC: 27 U/L
BILIRUB SERPL-MCNC: 0.8 MG/DL
BUN SERPL-MCNC: 16 MG/DL
CALCIUM SERPL-MCNC: 10.3 MG/DL
CHLORIDE SERPL-SCNC: 102 MMOL/L
CO2 SERPL-SCNC: 21 MMOL/L
CREAT SERPL-MCNC: 1.11 MG/DL
EGFRCR SERPLBLD CKD-EPI 2021: 80 ML/MIN/1.73M2
GLUCOSE SERPL-MCNC: 100 MG/DL
HCT VFR BLD CALC: 52.3 %
HGB BLD-MCNC: 17.2 G/DL
MCHC RBC-ENTMCNC: 29.7 PG
MCHC RBC-ENTMCNC: 32.9 G/DL
MCV RBC AUTO: 90.2 FL
NT-PROBNP SERPL-MCNC: <36 PG/ML
PLATELET # BLD AUTO: 293 K/UL
POTASSIUM SERPL-SCNC: 5.4 MMOL/L
PROT SERPL-MCNC: 7.2 G/DL
RBC # BLD: 5.8 M/UL
RBC # FLD: 14.3 %
SODIUM SERPL-SCNC: 137 MMOL/L
TSH SERPL-ACNC: 2.78 UIU/ML
WBC # FLD AUTO: 9.62 K/UL

## 2025-09-09 ENCOUNTER — APPOINTMENT (OUTPATIENT)
Dept: CT IMAGING | Facility: CLINIC | Age: 50
End: 2025-09-09
Payer: COMMERCIAL

## 2025-09-09 ENCOUNTER — APPOINTMENT (OUTPATIENT)
Dept: PULMONOLOGY | Facility: CLINIC | Age: 50
End: 2025-09-09

## 2025-09-09 PROCEDURE — 71250 CT THORAX DX C-: CPT | Mod: 26

## 2025-09-12 ENCOUNTER — RX RENEWAL (OUTPATIENT)
Age: 50
End: 2025-09-12